# Patient Record
Sex: MALE | Race: WHITE | NOT HISPANIC OR LATINO | Employment: STUDENT | URBAN - METROPOLITAN AREA
[De-identification: names, ages, dates, MRNs, and addresses within clinical notes are randomized per-mention and may not be internally consistent; named-entity substitution may affect disease eponyms.]

---
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
## 2021-02-23 ENCOUNTER — TELEPHONE (OUTPATIENT)
Dept: FAMILY MEDICINE CLINIC | Facility: CLINIC | Age: 21
End: 2021-02-23

## 2021-02-23 NOTE — TELEPHONE ENCOUNTER
Patient's mother called  Patient is scheduled for HPV 3 on 03/10/21  He is scheduled to come home from [facility] this Thursday and she wants to know if he can move the appt to this Thursday   She is hoping it is not too soon   Please advise i

## 2021-02-25 ENCOUNTER — OFFICE VISIT (OUTPATIENT)
Dept: FAMILY MEDICINE CLINIC | Facility: CLINIC | Age: 21
End: 2021-02-25
Payer: COMMERCIAL
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
## 2021-02-25 DIAGNOSIS — Z23 ENCOUNTER FOR IMMUNIZATION: ICD-10-CM

## 2021-02-25 DIAGNOSIS — S80.862A INSECT BITE OF LEFT LOWER LEG, INITIAL ENCOUNTER: Primary | ICD-10-CM

## 2021-02-25 DIAGNOSIS — S80.862A INSECT BITE OF LEFT LOWER LEG, INITIAL ENCOUNTER: ICD-10-CM
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
## 2021-02-25 PROCEDURE — 90651 9VHPV VACCINE 2/3 DOSE IM: CPT | Performed by: FAMILY MEDICINE

## 2021-02-25 PROCEDURE — 99213 OFFICE O/P EST LOW 20 MIN: CPT | Performed by: FAMILY MEDICINE

## 2021-02-25 PROCEDURE — 90471 IMMUNIZATION ADMIN: CPT | Performed by: FAMILY MEDICINE

## 2021-02-25 RX ORDER — DOXYCYCLINE HYCLATE 100 MG
100 TABLET ORAL 2 TIMES DAILY
Qty: 28 TABLET | Refills: 0 | Status: SHIPPED | OUTPATIENT
Start: 2021-02-25 | End: 2021-03-11

## 2021-02-25 NOTE — TELEPHONE ENCOUNTER
Please call patient and see if he was able to  Rx - very unusual for this not to be covered   If it isn't covered he can get it for $20 with [facility] and I would recommend that

## 2021-02-25 NOTE — PROGRESS NOTES
Quinton Urbina 2000 male MRN: [id number]    Acute Visit    Assessment/Plan   Verdel Severance was seen today for wound check and injections  Diagnoses and all orders for this visit:    Insect bite of left lower leg, initial encounter  -     doxycycline hyclate (VIBRA-TABS) 100 mg tablet; Take 1 tablet (100 mg total) by mouth 2 (two) times a day for 14 days    Encounter for immunization  -     HPV VACCINE 9 VALENT IM    Doxycycline for PPX in the case of a tick bite, as it initially appeared targetoid and he was already covered for staph with Bactrim by health center  Counseled the patient regarding supportive care  They are to call or return to the office if not improving  Yoly Mauro MD  [address]  2/26/2021      Please be aware that this note contains text that was dictated and there may be errors pertaining to "sound-alike "words during the dictation process  SUBJECTIVE    CC: Wound Check (left leg (staph)) and Injections    HPI:  Quinton Urbina is a 24 y o  male who presented for an acute visit complaining of left lower leg wound  He says he noticed it about a week ago  He said he thought he had a bug bite  He noticed a red ring around it  When asked, he says it did look like a target (red ring with central clearing)  It then shrunk down to a central redness, with surrounding blistering, and scabbing in the middle  He saw the health center and got Bactrim for 7 days which he finished yesterday  He denies fever, arthralgias, weakness, tiredness  Has not been hiking or in contact with animals  Was living down at [facility] when this occurred  Review of Systems   Constitutional: Negative for chills, fatigue and fever  HENT: Negative for congestion  Respiratory: Negative for chest tightness and shortness of breath  Cardiovascular: Negative for chest pain and palpitations  Gastrointestinal: Negative for nausea and vomiting  Skin: Positive for wound     All other systems reviewed and are negative  Medications:   Meds/Allergies   Current Outpatient Medications   Medication Sig Dispense Refill    EPINEPHrine (EPIPEN) 0 3 mg/0 3 mL SOAJ Inject 0 3 mL (0 3 mg total) into a muscle once for 1 dose 2 each 1    doxycycline hyclate (VIBRA-TABS) 100 mg tablet Take 1 tablet (100 mg total) by mouth 2 (two) times a day for 14 days 28 tablet 0     No current facility-administered medications for this visit  Allergies   Allergen Reactions    Cashew Nut Oil Anaphylaxis    Nuts Anaphylaxis     CASHEW/ PISTACHIO    Pistachio Nut Extract Skin Test Anaphylaxis       OBJECTIVE    Vitals:   Vitals:    02/25/21 1144   BP: 130/80   Pulse: 99   Temp: 98 8 °F (37 1 °C)   Weight: 104 kg (230 lb)   Height: 6' 9" (2 057 m)       Physical Exam  Vitals signs and nursing note reviewed  Constitutional:       General: He is not in acute distress  Appearance: He is well-developed  He is not diaphoretic  HENT:      Head: Normocephalic and atraumatic  Right Ear: External ear normal       Left Ear: External ear normal    Eyes:      Conjunctiva/sclera: Conjunctivae normal    Pulmonary:      Effort: Pulmonary effort is normal  No respiratory distress  Skin:     Findings: Wound present  Comments: Central scab with some surrounding granulation tissue  Minimal surrounding erythema  Non-tender  Neurological:      Mental Status: He is alert  Cranial Nerves: No cranial nerve deficit

## 2021-02-26 ENCOUNTER — CLINICAL SUPPORT (OUTPATIENT)
Dept: FAMILY MEDICINE CLINIC | Facility: CLINIC | Age: 21
End: 2021-02-26

## 2021-02-26 VITALS
HEART RATE: 99 BPM | DIASTOLIC BLOOD PRESSURE: 80 MMHG | TEMPERATURE: 98.8 F | HEIGHT: 78 IN | WEIGHT: 230 LBS | SYSTOLIC BLOOD PRESSURE: 130 MMHG | BODY MASS INDEX: 26.61 KG/M2

## 2021-02-26 VITALS — HEART RATE: 85 BPM | OXYGEN SATURATION: 99 % | TEMPERATURE: 97.9 F

## 2021-02-26 DIAGNOSIS — S80.862A INSECT BITE OF LEFT LOWER LEG, INITIAL ENCOUNTER: Primary | ICD-10-CM
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
## 2021-02-26 PROCEDURE — RECHECK: Performed by: FAMILY MEDICINE

## 2021-02-26 RX ORDER — DOXYCYCLINE HYCLATE 100 MG
TABLET ORAL
Qty: 28 TABLET | Refills: 0 | OUTPATIENT
Start: 2021-02-26

## 2021-02-26 NOTE — PROGRESS NOTES
Patient present today for pulse oximeter recheck  Patient O2 in office today ws 99%  Results noted in chart,  Dr Elmira Snider made aware of results  No other complaints

## 2021-03-18 ENCOUNTER — OFFICE VISIT (OUTPATIENT)
Dept: FAMILY MEDICINE CLINIC | Facility: CLINIC | Age: 21
End: 2021-03-18
Payer: COMMERCIAL

## 2021-03-18 VITALS
HEIGHT: 78 IN | WEIGHT: 229 LBS | SYSTOLIC BLOOD PRESSURE: 122 MMHG | TEMPERATURE: 97.1 F | OXYGEN SATURATION: 98 % | HEART RATE: 88 BPM | DIASTOLIC BLOOD PRESSURE: 82 MMHG | BODY MASS INDEX: 26.49 KG/M2

## 2021-03-18 DIAGNOSIS — R23.4 ESCHAR OF LOWER LEG: Primary | ICD-10-CM

## 2021-03-18 PROCEDURE — 99214 OFFICE O/P EST MOD 30 MIN: CPT | Performed by: FAMILY MEDICINE

## 2021-03-18 PROCEDURE — 3725F SCREEN DEPRESSION PERFORMED: CPT | Performed by: FAMILY MEDICINE

## 2021-03-19 NOTE — PROGRESS NOTES
Chel Correia 2000 male MRN: [id number]    Acute Visit    Assessment/Plan   Referred to wound care (to surgery if unable to get into wound care within a week) for debridement and further management  ER precautions    Florinda Lundborg was seen today for follow-up  Diagnoses and all orders for this visit:    Eschar of lower leg  -     Ambulatory referral to Wound Care; Future  -     Ambulatory referral to General Surgery; Future        Kadi Santiago MD  [address]  3/19/2021      Please be aware that this note contains text that was dictated and there may be errors pertaining to "sound-alike "words during the dictation process  SUBJECTIVE    CC: Follow-up (wound on leg calf x 3 weeks )    HPI:  Chel Correia is a 24 y o  male who presented for an acute visit complaining of non-healing wound  Patient is returning for the same wound that he had been here for previously  Since then it has gotten slightly larger  Mom reports that it had a blistering appearance, and now for the last week has developed a blacked scab to it in the middle with scant discharge  Patient denies pain, itching, fever, nausea, red streaking  He is applying Neosporin and keeping it covered with a Band-Aid  He completed Bactrim, then Keflex without improvement  Review of Systems   Constitutional: Negative for chills and fever  HENT: Negative for ear pain and sore throat  Eyes: Negative for pain and visual disturbance  Respiratory: Negative for cough and shortness of breath  Cardiovascular: Negative for chest pain and palpitations  Gastrointestinal: Negative for abdominal pain and vomiting  Genitourinary: Negative for dysuria and hematuria  Musculoskeletal: Negative for arthralgias and back pain  Skin: Positive for wound  Negative for color change and rash  Neurological: Negative for seizures and syncope  All other systems reviewed and are negative          Medications:   Meds/Allergies   Current Outpatient Medications   Medication Sig Dispense Refill    EPINEPHrine (EPIPEN) 0 3 mg/0 3 mL SOAJ Inject 0 3 mL (0 3 mg total) into a muscle once for 1 dose 2 each 1     No current facility-administered medications for this visit  Allergies   Allergen Reactions    Cashew Nut Oil Anaphylaxis    Nuts Anaphylaxis     CASHEW/ PISTACHIO    Pistachio Nut Extract Skin Test Anaphylaxis     OBJECTIVE    Vitals:   Vitals:    03/18/21 1034   BP: 122/82   Pulse: 88   Temp: (!) 97 1 °F (36 2 °C)   SpO2: 98%   Weight: 104 kg (229 lb)   Height: 6' 9" (2 057 m)     Physical Exam  Vitals signs and nursing note reviewed  Constitutional:       General: He is not in acute distress  Appearance: He is well-developed  He is not diaphoretic  HENT:      Head: Normocephalic and atraumatic  Right Ear: External ear normal       Left Ear: External ear normal    Eyes:      Conjunctiva/sclera: Conjunctivae normal    Pulmonary:      Effort: Pulmonary effort is normal  No respiratory distress  Skin:     Findings: No rash  Comments: Left lower leg, midway, posterior aspect  Wound present approx 3cm diameter  Rolled edges around granulation tissue and approx 1cm eschar in center  Neurological:      Mental Status: He is alert  Cranial Nerves: No cranial nerve deficit

## 2021-03-25 ENCOUNTER — OFFICE VISIT (OUTPATIENT)
Dept: WOUND CARE | Facility: HOSPITAL | Age: 21
End: 2021-03-25
Payer: COMMERCIAL

## 2021-03-25 VITALS
SYSTOLIC BLOOD PRESSURE: 138 MMHG | HEART RATE: 88 BPM | DIASTOLIC BLOOD PRESSURE: 82 MMHG | WEIGHT: 230 LBS | RESPIRATION RATE: 16 BRPM | BODY MASS INDEX: 26.61 KG/M2 | HEIGHT: 78 IN | TEMPERATURE: 98.2 F

## 2021-03-25 DIAGNOSIS — S81.802A OPEN WOUND OF LEFT LOWER EXTREMITY, INITIAL ENCOUNTER: Primary | ICD-10-CM

## 2021-03-25 PROCEDURE — 11042 DBRDMT SUBQ TIS 1ST 20SQCM/<: CPT | Performed by: FAMILY MEDICINE

## 2021-03-25 PROCEDURE — 99203 OFFICE O/P NEW LOW 30 MIN: CPT | Performed by: FAMILY MEDICINE

## 2021-03-25 PROCEDURE — 99213 OFFICE O/P EST LOW 20 MIN: CPT | Performed by: FAMILY MEDICINE

## 2021-03-25 PROCEDURE — G0463 HOSPITAL OUTPT CLINIC VISIT: HCPCS | Performed by: FAMILY MEDICINE

## 2021-03-25 RX ORDER — LIDOCAINE HYDROCHLORIDE 40 MG/ML
5 SOLUTION TOPICAL ONCE
Status: COMPLETED | OUTPATIENT
Start: 2021-03-25 | End: 2021-03-25

## 2021-03-25 RX ADMIN — LIDOCAINE HYDROCHLORIDE 5 ML: 40 SOLUTION TOPICAL at 13:10

## 2021-03-25 NOTE — PROGRESS NOTES
Patient ID: Panda Fischer is a 24 y o  male Date of Birth 2000     Chief Complaint  Chief Complaint   Patient presents with   [facility] Patient Visit     left leg wound        Allergies  Cashew nut oil, Nuts, and Pistachio nut extract skin test    Assessment:    Open wound of left lower extremity    Initial evaluation   Debrided as below   Wound management dermagran   No harsh cleansers such as alcohol, peroxide, or antibacterial soap   followup in 1 week or call sooner with questions or concerns       Diagnoses and all orders for this visit:    Open wound of left lower extremity, initial encounter  -     lidocaine (XYLOCAINE) 4 % topical solution 5 mL  -     Wound cleansing and dressings; Future  -     Wound miscellaneous orders; Future  -     Debridement              Debridement   Wound 03/25/21 Traumatic Leg Left;Posterior    Universal Protocol:  Consent: Verbal consent obtained  Consent given by: patient  Time out: Immediately prior to procedure a "time out" was called to verify the correct patient, procedure, equipment, support staff and site/side marked as required    Timeout called at: 3/25/2021 1:10 PM   Patient understanding: patient states understanding of the procedure being performed  Patient identity confirmed: verbally with patient      Performed by: physician  Debridement type: surgical  Level of debridement: subcutaneous tissue  Pain control: lidocaine 4%  Post-debridement measurements  Length (cm): 2 4  Width (cm): 2  Depth (cm): 0 3  Percent debrided: 100%  Surface Area (cm^2): 4 8  Area debrided (cm^2): 4 8  Volume (cm^3): 1 44  Tissue and other material debrided: subcutaneous tissue  Devitalized tissue debrided: exudate, slough and eschar  Instrument(s) utilized: forceps, blade and curette  Bleeding: small  Hemostasis obtained with: pressure  Procedural pain (0-10): 0  Post-procedural pain: 0   Response to treatment: procedure was tolerated well          Plan:     Wound 03/25/21 Traumatic Leg Left;Posterior (Active)   Wound Image Images linked 03/25/21 1303   Wound Description Slough;Black;Granulation tissue; Yellow;Pink 03/25/21 1310   Amanda-wound Assessment Pink; Intact 03/25/21 1310   Wound Length (cm) 2 4 cm 03/25/21 1310   Wound Width (cm) 2 cm 03/25/21 1310   Wound Depth (cm) 0 2 cm 03/25/21 1310   Wound Surface Area (cm^2) 4 8 cm^2 03/25/21 1310   Wound Volume (cm^3) 0 96 cm^3 03/25/21 1310   Calculated Wound Volume (cm^3) 0 96 cm^3 03/25/21 1310   Drainage Amount Small 03/25/21 1310   Drainage Description Serosanguineous 03/25/21 1310   Non-staged Wound Description Full thickness 03/25/21 1310       Wound 09/24/20 Ear Bilateral (Active)   Date First Assessed/Time First Assessed: 09/24/20 1146   Location: Ear  Wound Location Orientation: Bilateral  Incision's 1st Dressing: DRESSING XEROFORM 5 X 9 (x2), SPONGE GAUZE 4 X 4 16 PLY STRL PLASTIC TRAY LF (x2), DRESSING SURGIPAD 5 X 9 IN STRL     Wound 03/25/21 Traumatic Leg Left;Posterior (Active)   Date First Assessed/Time First Assessed: 03/25/21 1302   Primary Wound Type: Traumatic  Location: Leg  Wound Location Orientation: Left;Posterior       Subjective:        Patient presents for initial evaluation of left lower extremity wound present for approximately 1 month  Patient states that he woke up one morning with it and thus assumed it a was bug bite  He has seen his PCP twice and treated with a course of antibiotics which he completed about a week and half ago  He has been using topical Neosporin and covering with a Band-Aid  He has been cleaning with soap and water  No diabetes  No smoking  The following portions of the patient's history were reviewed and updated as appropriate:   He  has no past medical history on file    He   Patient Active Problem List    Diagnosis Date Noted    Open wound of left lower extremity 03/25/2021     He  has a past surgical history that includes No past surgeries and pr otoplasty protruding ear w/wo size rdctj (Bilateral, 9/24/2020)  His family history is not on file  He  reports that he has never smoked  He has never used smokeless tobacco  He reports that he does not drink alcohol or use drugs  Current Outpatient Medications   Medication Sig Dispense Refill    EPINEPHrine (EPIPEN) 0 3 mg/0 3 mL SOAJ Inject 0 3 mL (0 3 mg total) into a muscle once for 1 dose 2 each 1     No current facility-administered medications for this visit  He is allergic to cashew nut oil; nuts; and pistachio nut extract skin test     Review of Systems   Constitutional: Negative for chills and fever  HENT: Negative for congestion and sneezing  Respiratory: Negative for cough  Cardiovascular: Negative for leg swelling  Musculoskeletal: Negative for gait problem  Skin: Positive for wound  Psychiatric/Behavioral: Negative for agitation  Objective:       Wound 03/25/21 Traumatic Leg Left;Posterior (Active)   Wound Image Images linked 03/25/21 1303   Wound Description Slough;Black;Granulation tissue; Yellow;Pink 03/25/21 1310   Amanda-wound Assessment Pink; Intact 03/25/21 1310   Wound Length (cm) 2 4 cm 03/25/21 1310   Wound Width (cm) 2 cm 03/25/21 1310   Wound Depth (cm) 0 2 cm 03/25/21 1310   Wound Surface Area (cm^2) 4 8 cm^2 03/25/21 1310   Wound Volume (cm^3) 0 96 cm^3 03/25/21 1310   Calculated Wound Volume (cm^3) 0 96 cm^3 03/25/21 1310   Drainage Amount Small 03/25/21 1310   Drainage Description Serosanguineous 03/25/21 1310   Non-staged Wound Description Full thickness 03/25/21 1310       /82   Pulse 88   Temp 98 2 °F (36 8 °C)   Resp 16   Ht 6' 9" (2 057 m)   Wt 104 kg (230 lb)   BMI 24 65 kg/m²     Physical Exam  Constitutional:       General: He is not in acute distress  Appearance: Normal appearance  He is not ill-appearing or toxic-appearing  HENT:      Head: Normocephalic and atraumatic        Right Ear: External ear normal       Left Ear: External ear normal    Eyes: Conjunctiva/sclera: Conjunctivae normal    Neck:      Musculoskeletal: Neck supple  Pulmonary:      Effort: Pulmonary effort is normal  No respiratory distress  Musculoskeletal:      Right lower leg: No edema  Left lower leg: No edema  Skin:     Comments: See wound assessment   Neurological:      Mental Status: He is alert  Gait: Gait normal    Psychiatric:         Mood and Affect: Mood normal          Behavior: Behavior normal            Wound Instructions:  Orders Placed This Encounter   Procedures    Wound cleansing and dressings     Wash your hands with soap and water  Remove old dressing, discard into plastic bag and place in trash  Cleanse the wound with mild soap and water prior to applying a clean dressing  Do not use tissue or cotton balls  Do not scrub the wound  Pat dry using gauze  Shower yes   Apply dermagran gauze to wound   Cover with gauze   Secure with alma delia and tape   Change dressing every other day   This was performed at wound care center today     Standing Status:   Future     Standing Expiration Date:   3/25/2022    Wound miscellaneous orders     Supplies ordered through [employer]  Phone number [phone]     Standing Status:   Future     Standing Expiration Date:   3/25/2022    Debridement     This order was created via procedure documentation        Diagnosis ICD-10-CM Associated Orders   1   Open wound of left lower extremity, initial encounter  S81 802A lidocaine (XYLOCAINE) 4 % topical solution 5 mL     Wound cleansing and dressings     Wound miscellaneous orders     Debridement

## 2021-03-25 NOTE — ASSESSMENT & PLAN NOTE
Initial evaluation   Debrided as below   Wound management dermagran   No harsh cleansers such as alcohol, peroxide, or antibacterial soap   followup in 1 week or call sooner with questions or concerns

## 2021-03-25 NOTE — PATIENT INSTRUCTIONS
Orders Placed This Encounter   Procedures    Wound cleansing and dressings     Wash your hands with soap and water  Remove old dressing, discard into plastic bag and place in trash  Cleanse the wound with mild soap and water prior to applying a clean dressing  Do not use tissue or cotton balls  Do not scrub the wound  Pat dry using gauze  Shower yes   Apply dermagran gauze to wound   Cover with gauze   Secure with alma delia and tape   Change dressing every other day   This was performed at wound care center today     Standing Status:   Future     Standing Expiration Date:   3/25/2022    Wound miscellaneous orders     Supplies ordered through [employer]   Phone number [phone]      Standing Status:   Future     Standing Expiration Date:   3/25/2022

## 2021-04-01 ENCOUNTER — OFFICE VISIT (OUTPATIENT)
Dept: WOUND CARE | Facility: HOSPITAL | Age: 21
End: 2021-04-01
Payer: COMMERCIAL

## 2021-04-01 VITALS
RESPIRATION RATE: 16 BRPM | TEMPERATURE: 97.9 F | DIASTOLIC BLOOD PRESSURE: 76 MMHG | HEART RATE: 80 BPM | SYSTOLIC BLOOD PRESSURE: 122 MMHG

## 2021-04-01 DIAGNOSIS — S81.802A OPEN WOUND OF LEFT LOWER EXTREMITY, INITIAL ENCOUNTER: Primary | ICD-10-CM

## 2021-04-01 PROCEDURE — 11042 DBRDMT SUBQ TIS 1ST 20SQCM/<: CPT | Performed by: FAMILY MEDICINE

## 2021-04-01 RX ORDER — LIDOCAINE HYDROCHLORIDE 40 MG/ML
5 SOLUTION TOPICAL ONCE
Status: COMPLETED | OUTPATIENT
Start: 2021-04-01 | End: 2021-04-01

## 2021-04-01 RX ADMIN — LIDOCAINE HYDROCHLORIDE 5 ML: 40 SOLUTION TOPICAL at 14:26

## 2021-04-01 NOTE — PATIENT INSTRUCTIONS
Orders Placed This Encounter   Procedures    Wound cleansing and dressings     Wound cleansing and dressings       Wash your hands with soap and water  Remove old dressing, discard into plastic bag and place in trash  Cleanse the wound with mild soap and water prior to applying a clean dressing  Do not scrub the wound  Pat dry using gauze  Shower yes on dressing change days  Sponge bath other days or use cast cover    Apply puracol ag to wound bed  Cut to fit   Followed by hydrogel   Cover with gauze   Secure with alma delia and tape   Change dressing 3 times a week   This was performed at wound care center today     Standing Status:   Future     Standing Expiration Date:   4/1/2022

## 2021-04-01 NOTE — PROGRESS NOTES
Patient ID: Rose Rivera is a 24 y o  male Date of Birth 2000     Chief Complaint  Chief Complaint   Patient presents with    Follow Up Wound Care Visit     left leg wound        Allergies  Cashew nut oil - food allergy, Nuts - food allergy, and Pistachio nut extract skin test - food allergy    Assessment:    Open wound of left lower extremity   Base of the wound has improved but the size has not changed dramatically  Debrided as below   to purachol Ag and Hydrogel   followup in 1 week or call sooner with questions or concerns       Diagnoses and all orders for this visit:    Open wound of left lower extremity, initial encounter  -     lidocaine (XYLOCAINE) 4 % topical solution 5 mL  -     Wound cleansing and dressings; Future  -     Debridement              Debridement   Wound 03/25/21 Traumatic Leg Left;Posterior    Universal Protocol:  Consent: Verbal consent obtained  Consent given by: patient  Time out: Immediately prior to procedure a "time out" was called to verify the correct patient, procedure, equipment, support staff and site/side marked as required    Timeout called at: 4/1/2021 2:00 PM   Patient understanding: patient states understanding of the procedure being performed  Patient identity confirmed: verbally with patient      Performed by: physician  Debridement type: surgical  Level of debridement: subcutaneous tissue  Pain control: lidocaine 4%  Post-debridement measurements  Length (cm): 2 3  Width (cm): 2  Depth (cm): 0 2  Percent debrided: 100%  Surface Area (cm^2): 4 6  Area debrided (cm^2): 4 6  Volume (cm^3): 0 92  Tissue and other material debrided: subcutaneous tissue  Devitalized tissue debrided: exudate and slough  Instrument(s) utilized: curette  Bleeding: small  Hemostasis obtained with: pressure  Procedural pain (0-10): 0  Post-procedural pain: 0   Response to treatment: procedure was tolerated well          Plan:     Wound 03/25/21 Traumatic Leg Left;Posterior (Active)   Wound Image Images linked 04/01/21 1447   Wound Description Granulation tissue;Pink 04/01/21 1427   Amanda-wound Assessment Pink; Intact 04/01/21 1427   Wound Length (cm) 2 3 cm 04/01/21 1427   Wound Width (cm) 2 cm 04/01/21 1427   Wound Depth (cm) 0 1 cm 04/01/21 1427   Wound Surface Area (cm^2) 4 6 cm^2 04/01/21 1427   Wound Volume (cm^3) 0 46 cm^3 04/01/21 1427   Calculated Wound Volume (cm^3) 0 46 cm^3 04/01/21 1427   Change in Wound Size % 52 08 04/01/21 1427   Drainage Amount Small 04/01/21 1427   Drainage Description Serosanguineous 04/01/21 1427   Non-staged Wound Description Full thickness 04/01/21 1427       Wound 09/24/20 Ear Bilateral (Active)   Date First Assessed/Time First Assessed: 09/24/20 1146   Location: Ear  Wound Location Orientation: Bilateral  Incision's 1st Dressing: DRESSING XEROFORM 5 X 9 (x2), SPONGE GAUZE 4 X 4 16 PLY STRL PLASTIC TRAY LF (x2), DRESSING SURGIPAD 5 X 9 IN STRL     Wound 03/25/21 Traumatic Leg Left;Posterior (Active)   Date First Assessed/Time First Assessed: 03/25/21 1302   Primary Wound Type: Traumatic  Location: Leg  Wound Location Orientation: Left;Posterior       Subjective:           3/25/21:Patient presents for initial evaluation of left lower extremity wound present for approximately 1 month  Patient states that he woke up one morning with it and thus assumed it a was bug bite  He has seen his PCP twice and treated with a course of antibiotics which he completed about a week and half ago  He has been using topical Neosporin and covering with a Band-Aid  He has been cleaning with soap and water  No diabetes  No smoking        4/1/21: Patient presents for followup of a left lower extremity wound  No increased pain or drainage  Has been using dermagran on the wound      The following portions of the patient's history were reviewed and updated as appropriate: He  has no past medical history on file  He  reports that he has never smoked   He has never used smokeless tobacco  He reports that he does not drink alcohol or use drugs  He is allergic to cashew nut oil - food allergy; nuts - food allergy; and pistachio nut extract skin test - food allergy       Review of Systems   Constitutional: Negative for chills and fever  HENT: Negative for congestion and sneezing  Respiratory: Negative for cough  Musculoskeletal: Negative for gait problem  Skin: Positive for wound  Psychiatric/Behavioral: Negative for agitation  Objective:       Wound 03/25/21 Traumatic Leg Left;Posterior (Active)   Wound Image Images linked 04/01/21 1447   Wound Description Granulation tissue;Pink 04/01/21 1427   Amanda-wound Assessment Pink; Intact 04/01/21 1427   Wound Length (cm) 2 3 cm 04/01/21 1427   Wound Width (cm) 2 cm 04/01/21 1427   Wound Depth (cm) 0 1 cm 04/01/21 1427   Wound Surface Area (cm^2) 4 6 cm^2 04/01/21 1427   Wound Volume (cm^3) 0 46 cm^3 04/01/21 1427   Calculated Wound Volume (cm^3) 0 46 cm^3 04/01/21 1427   Change in Wound Size % 52 08 04/01/21 1427   Drainage Amount Small 04/01/21 1427   Drainage Description Serosanguineous 04/01/21 1427   Non-staged Wound Description Full thickness 04/01/21 1427       /76   Pulse 80   Temp 97 9 °F (36 6 °C)   Resp 16     Physical Exam      Wound Instructions:  Orders Placed This Encounter   Procedures    Wound cleansing and dressings     Wound cleansing and dressings       Wash your hands with soap and water  Remove old dressing, discard into plastic bag and place in trash  Cleanse the wound with mild soap and water prior to applying a clean dressing  Do not scrub the wound  Pat dry using gauze  Shower yes on dressing change days  Sponge bath other days or use cast cover    Apply puracol ag to wound bed  Cut to fit   Followed by hydrogel   Cover with gauze   Secure with alma delia and tape   Change dressing 3 times a week   This was performed at wound care center today     Standing Status:   Future Standing Expiration Date:   4/1/2022    Debridement     This order was created via procedure documentation        Diagnosis ICD-10-CM Associated Orders   1   Open wound of left lower extremity, initial encounter  S81 802A lidocaine (XYLOCAINE) 4 % topical solution 5 mL     Wound cleansing and dressings     Debridement

## 2021-04-01 NOTE — ASSESSMENT & PLAN NOTE
Base of the wound has improved but the size has not changed dramatically  Debrided as below   to purachol Ag and Hydrogel   followup in 1 week or call sooner with questions or concerns

## 2021-04-08 ENCOUNTER — OFFICE VISIT (OUTPATIENT)
Dept: WOUND CARE | Facility: HOSPITAL | Age: 21
End: 2021-04-08
Payer: COMMERCIAL

## 2021-04-08 VITALS
HEART RATE: 104 BPM | TEMPERATURE: 96.4 F | RESPIRATION RATE: 14 BRPM | SYSTOLIC BLOOD PRESSURE: 112 MMHG | DIASTOLIC BLOOD PRESSURE: 76 MMHG

## 2021-04-08 DIAGNOSIS — S81.802A OPEN WOUND OF LEFT LOWER EXTREMITY, INITIAL ENCOUNTER: Primary | ICD-10-CM

## 2021-04-08 PROCEDURE — 99212 OFFICE O/P EST SF 10 MIN: CPT | Performed by: FAMILY MEDICINE

## 2021-04-08 PROCEDURE — G0463 HOSPITAL OUTPT CLINIC VISIT: HCPCS | Performed by: FAMILY MEDICINE

## 2021-04-08 PROCEDURE — 99213 OFFICE O/P EST LOW 20 MIN: CPT | Performed by: FAMILY MEDICINE

## 2021-04-08 RX ORDER — LIDOCAINE HYDROCHLORIDE 40 MG/ML
5 SOLUTION TOPICAL ONCE
Status: COMPLETED | OUTPATIENT
Start: 2021-04-08 | End: 2021-04-08

## 2021-04-08 RX ADMIN — LIDOCAINE HYDROCHLORIDE 5 ML: 40 SOLUTION TOPICAL at 13:16

## 2021-04-08 NOTE — PATIENT INSTRUCTIONS
Orders Placed This Encounter   Procedures    Wound cleansing and dressings     Wash your hands with soap and water  Remove old dressing, discard into plastic bag and place in trash  Cleanse the wound with mild soap and water prior to applying a clean dressing  Do not scrub the wound  Pat dry using gauze  Shower yes on dressing change days  Sponge bath other days or use cast cover     Apply puracol ag to wound bed  Cut to fit   Followed by hydrogel   Cover with gauze   Secure with alma delia and tape   Change dressing 3 times a week   This was performed at wound care center today     Standing Status:   Future     Standing Expiration Date:   4/8/2022

## 2021-04-09 NOTE — ASSESSMENT & PLAN NOTE
Wound is improved   wound is clean, no debridement today  Continue wound management with purachol Ag and Hydrogel   followup in 2 weeks or call sooner with questions or concerns

## 2021-04-09 NOTE — PROGRESS NOTES
Patient ID: Nyoka Snellen is a 24 y o  male Date of Birth 2000     Chief Complaint  Chief Complaint   Patient presents with    Follow Up Wound Care Visit     left leg wound       Allergies  Cashew nut oil - food allergy, Nuts - food allergy, and Pistachio nut extract skin test - food allergy    Assessment:    Open wound of left lower extremity   Wound is improved   wound is clean, no debridement today  Continue wound management with purachol Ag and Hydrogel   followup in 2 weeks or call sooner with questions or concerns       Diagnoses and all orders for this visit:    Open wound of left lower extremity, initial encounter  -     lidocaine (XYLOCAINE) 4 % topical solution 5 mL  -     Wound cleansing and dressings; Future              Procedures    Plan:     Wound 03/25/21 Traumatic Leg Left;Posterior (Active)   Wound Image Images linked 04/08/21 1311   Wound Description Granulation tissue;Pink;Epithelialization 04/08/21 1315   Amanda-wound Assessment Pink; Intact 04/08/21 1315   Wound Length (cm) 2 cm 04/08/21 1315   Wound Width (cm) 1 9 cm 04/08/21 1315   Wound Depth (cm) 0 1 cm 04/08/21 1315   Wound Surface Area (cm^2) 3 8 cm^2 04/08/21 1315   Wound Volume (cm^3) 0 38 cm^3 04/08/21 1315   Calculated Wound Volume (cm^3) 0 38 cm^3 04/08/21 1315   Change in Wound Size % 60 42 04/08/21 1315   Drainage Amount Small 04/08/21 1315   Drainage Description Serosanguineous; Bloody 04/08/21 1315   Non-staged Wound Description Full thickness 04/08/21 1315       Wound 09/24/20 Ear Bilateral (Active)   Date First Assessed/Time First Assessed: 09/24/20 1146   Location: Ear  Wound Location Orientation: Bilateral  Incision's 1st Dressing: DRESSING XEROFORM 5 X 9 (x2), SPONGE GAUZE 4 X 4 16 PLY STRL PLASTIC TRAY LF (x2), DRESSING SURGIPAD 5 X 9 IN STRL            Wound 03/25/21 Traumatic Leg Left;Posterior (Active)   Date First Assessed/Time First Assessed: 03/25/21 1302   Primary Wound Type: Traumatic  Location: Leg  Wound Location Orientation: Left;Posterior       Subjective:           3/25/21:Patient presents for initial evaluation of left lower extremity wound present for approximately 1 month  Patient states that he woke up one morning with it and thus assumed it a was bug bite  He has seen his PCP twice and treated with a course of antibiotics which he completed about a week and half ago  He has been using topical Neosporin and covering with a Band-Aid  He has been cleaning with soap and water  No diabetes  No smoking       4/1/21: Patient presents for followup of a left lower extremity wound  No increased pain or drainage  Has been using dermagran on the wound    4/9/21:  followup left lower extremity wound  No increased pain or drainage  Has been using purachol Ag and Hydrogel      The following portions of the patient's history were reviewed and updated as appropriate: He  has no past medical history on file  He  reports that he has never smoked  He has never used smokeless tobacco  He reports that he does not drink alcohol or use drugs  He is allergic to cashew nut oil - food allergy; nuts - food allergy; and pistachio nut extract skin test - food allergy       Review of Systems   Constitutional: Negative for chills and fever  HENT: Negative for congestion and sneezing  Respiratory: Negative for cough  Musculoskeletal: Negative for gait problem  Skin: Positive for wound  Psychiatric/Behavioral: Negative for agitation  Objective:       Wound 03/25/21 Traumatic Leg Left;Posterior (Active)   Wound Image Images linked 04/08/21 1311   Wound Description Granulation tissue;Pink;Epithelialization 04/08/21 1315   Amanda-wound Assessment Pink; Intact 04/08/21 1315   Wound Length (cm) 2 cm 04/08/21 1315   Wound Width (cm) 1 9 cm 04/08/21 1315   Wound Depth (cm) 0 1 cm 04/08/21 1315   Wound Surface Area (cm^2) 3 8 cm^2 04/08/21 1315   Wound Volume (cm^3) 0 38 cm^3 04/08/21 1315   Calculated Wound Volume (cm^3) 0 38 cm^3 04/08/21 1315   Change in Wound Size % 60 42 04/08/21 1315   Drainage Amount Small 04/08/21 1315   Drainage Description Serosanguineous; Bloody 04/08/21 1315   Non-staged Wound Description Full thickness 04/08/21 1315       /76   Pulse 104   Temp (!) 96 4 °F (35 8 °C)   Resp 14     Physical Exam  Constitutional:       General: He is not in acute distress  Appearance: Normal appearance  He is not ill-appearing, toxic-appearing or diaphoretic  HENT:      Head: Normocephalic and atraumatic  Right Ear: External ear normal       Left Ear: External ear normal    Eyes:      Conjunctiva/sclera: Conjunctivae normal    Neck:      Musculoskeletal: Neck supple  Pulmonary:      Effort: Pulmonary effort is normal  No respiratory distress  Musculoskeletal:      Right lower leg: No edema  Left lower leg: No edema  Skin:     Comments: See wound assessment   Neurological:      Mental Status: He is alert  Gait: Gait normal    Psychiatric:         Mood and Affect: Mood normal          Behavior: Behavior normal            Wound Instructions:  Orders Placed This Encounter   Procedures    Wound cleansing and dressings     Wash your hands with soap and water  Remove old dressing, discard into plastic bag and place in trash  Cleanse the wound with mild soap and water prior to applying a clean dressing  Do not scrub the wound  Pat dry using gauze  Shower yes on dressing change days  Sponge bath other days or use cast cover     Apply puracol ag to wound bed  Cut to fit  Followed by hydrogel   Cover with gauze   Secure with alma delia and tape   Change dressing 3 times a week   This was performed at wound care center today     Standing Status:   Future     Standing Expiration Date:   4/8/2022        Diagnosis ICD-10-CM Associated Orders   1   Open wound of left lower extremity, initial encounter  S81 802A lidocaine (XYLOCAINE) 4 % topical solution 5 mL     Wound cleansing and dressings

## 2021-04-22 ENCOUNTER — OFFICE VISIT (OUTPATIENT)
Dept: WOUND CARE | Facility: HOSPITAL | Age: 21
End: 2021-04-22
Payer: COMMERCIAL

## 2021-04-22 VITALS
SYSTOLIC BLOOD PRESSURE: 130 MMHG | HEART RATE: 68 BPM | DIASTOLIC BLOOD PRESSURE: 80 MMHG | RESPIRATION RATE: 20 BRPM | TEMPERATURE: 98.3 F

## 2021-04-22 DIAGNOSIS — S81.802A OPEN WOUND OF LEFT LOWER EXTREMITY, INITIAL ENCOUNTER: Primary | ICD-10-CM

## 2021-04-22 PROCEDURE — 17250 CHEM CAUT OF GRANLTJ TISSUE: CPT | Performed by: FAMILY MEDICINE

## 2021-04-22 RX ORDER — LIDOCAINE HYDROCHLORIDE 40 MG/ML
5 SOLUTION TOPICAL ONCE
Status: COMPLETED | OUTPATIENT
Start: 2021-04-22 | End: 2021-04-22

## 2021-04-22 RX ADMIN — LIDOCAINE HYDROCHLORIDE 5 ML: 40 SOLUTION TOPICAL at 15:23

## 2021-04-22 NOTE — PROGRESS NOTES
Patient ID: Osvaldo Torrez is a 24 y o  male Date of Birth 2000     Chief Complaint  Chief Complaint   Patient presents with    Follow Up Wound Care Visit     Left lower leg       Allergies  Cashew nut oil - food allergy, Nuts - food allergy, and Pistachio nut extract skin test - food allergy    Assessment:    Open wound of left lower extremity   Wound is improved but appears slightly hyper granular today  Hyper granular areas were cauterized today using silver nitrate   Continue wound management with Purachol Ag   Followup in 2 weeks or call sooner with questions or concerns       Diagnoses and all orders for this visit:    Open wound of left lower extremity, initial encounter  -     lidocaine (XYLOCAINE) 4 % topical solution 5 mL  -     Wound cleansing and dressings; Future    Other orders  -     Chemical Caut Of A Wound                    Chemical Caut Of A Wound     Date/Time 4/22/2021 3:15 PM     Performed by  Jess Urban DO     Authorized by Jess Urban DO       Associated Wounds:   Wound 03/25/21 Traumatic Leg Left;Posterior     Universal Protocol   Consent: Verbal consent obtained  Consent given by: patient  Timeout called at: 4/22/2021 3:15 PM   Patient understanding: patient states understanding of the procedure being performed  Patient identity confirmed: verbally with patient        Local anesthesia used: yes     Anesthesia   Local anesthesia used: yes  Local Anesthetic: topical anesthetic     Sedation   Patient sedated: no        Specimen: no    Culture: no   Procedure Details   Procedure Notes: 1 stick used of silver nitrate  Patient tolerance: patient tolerated the procedure well with no immediate complications             Plan:     Wound 03/25/21 Traumatic Leg Left;Posterior (Active)   Wound Image Images linked 04/22/21 1518   Wound Description Granulation tissue; Hypergranulation;Pink;Yellow 04/22/21 1517   Amanda-wound Assessment Intact;Scar Tissue 04/22/21 1517   Wound Length (cm) 1 2 cm 04/22/21 1517   Wound Width (cm) 1 2 cm 04/22/21 1517   Wound Depth (cm) 0 1 cm 04/22/21 1517   Wound Surface Area (cm^2) 1 44 cm^2 04/22/21 1517   Wound Volume (cm^3) 0 14 cm^3 04/22/21 1517   Calculated Wound Volume (cm^3) 0 14 cm^3 04/22/21 1517   Change in Wound Size % 85 42 04/22/21 1517   Drainage Amount Scant 04/22/21 1517   Drainage Description Serosanguineous 04/22/21 1517   Non-staged Wound Description Full thickness 04/22/21 1517       Wound 09/24/20 Ear Bilateral (Active)   Date First Assessed/Time First Assessed: 09/24/20 1146   Location: Ear  Wound Location Orientation: Bilateral  Incision's 1st Dressing: DRESSING XEROFORM 5 X 9 (x2), SPONGE GAUZE 4 X 4 16 PLY STRL PLASTIC TRAY LF (x2), DRESSING SURGIPAD 5 X 9 IN STRL     Wound 03/25/21 Traumatic Leg Left;Posterior (Active)   Date First Assessed/Time First Assessed: 03/25/21 1302   Primary Wound Type: Traumatic  Location: Leg  Wound Location Orientation: Left;Posterior       Subjective:            3/25/21:Patient presents for initial evaluation of left lower extremity wound present for approximately 1 month  Patient states that he woke up one morning with it and thus assumed it a was bug bite  He has seen his PCP twice and treated with a course of antibiotics which he completed about a week and half ago  He has been using topical Neosporin and covering with a Band-Aid  He has been cleaning with soap and water  No diabetes  No smoking       4/1/21: Patient presents for followup of a left lower extremity wound  No increased pain or drainage  Has been using dermagran on the wound     4/9/21:  followup left lower extremity wound  No increased pain or drainage  Has been using purachol Ag and Hydrogel    4/22: Patient presents for followup of left lower extremity wound  No increased pain or drainage    Has been using purachol Ag and Hydrogel      The following portions of the patient's history were reviewed and updated as appropriate: He  has no past medical history on file  He  reports that he has never smoked  He has never used smokeless tobacco  He reports that he does not drink alcohol or use drugs  He is allergic to cashew nut oil - food allergy; nuts - food allergy; and pistachio nut extract skin test - food allergy       Review of Systems   Constitutional: Negative for chills and fever  HENT: Negative for congestion and sneezing  Respiratory: Negative for cough  Cardiovascular: Negative for leg swelling  Musculoskeletal: Negative for gait problem  Skin: Positive for wound  Psychiatric/Behavioral: Negative for agitation  Objective:       Wound 03/25/21 Traumatic Leg Left;Posterior (Active)   Wound Image Images linked 04/22/21 1518   Wound Description Granulation tissue; Hypergranulation;Pink;Yellow 04/22/21 1517   Amanda-wound Assessment Intact;Scar Tissue 04/22/21 1517   Wound Length (cm) 1 2 cm 04/22/21 1517   Wound Width (cm) 1 2 cm 04/22/21 1517   Wound Depth (cm) 0 1 cm 04/22/21 1517   Wound Surface Area (cm^2) 1 44 cm^2 04/22/21 1517   Wound Volume (cm^3) 0 14 cm^3 04/22/21 1517   Calculated Wound Volume (cm^3) 0 14 cm^3 04/22/21 1517   Change in Wound Size % 85 42 04/22/21 1517   Drainage Amount Scant 04/22/21 1517   Drainage Description Serosanguineous 04/22/21 1517   Non-staged Wound Description Full thickness 04/22/21 1517       /80   Pulse 68   Temp 98 3 °F (36 8 °C) (Temporal)   Resp 20     Physical Exam      Wound Instructions:  Orders Placed This Encounter   Procedures    Wound cleansing and dressings     Wound cleansing and dressings  LLE Wound:    Wash your hands with soap and water  Cleanse the wound with mild soap and water prior to applying a clean dressing  Do not scrub the wound  Pat dry using gauze  Shower yes on dressing change days  Sponge bath other days or use cast cover     Apply puracol ag to wound bed  Cut to fit   Followed by hydrogel   Cover with gauze   Secure with alma delia and tape   Change dressing 3 times a week     Your wound will have a gray appearance, this will fade over time  This was performed at wound care center today    Follow up in 2 weeks at the [address] [facility],[address]     Standing Status:   Future     Standing Expiration Date:   4/22/2022    Chemical Caut Of A Wound     This order was created via procedure documentation        Diagnosis ICD-10-CM Associated Orders   1   Open wound of left lower extremity, initial encounter  S81 802A lidocaine (XYLOCAINE) 4 % topical solution 5 mL     Wound cleansing and dressings

## 2021-04-22 NOTE — PATIENT INSTRUCTIONS
Orders Placed This Encounter   Procedures    Wound cleansing and dressings     Wound cleansing and dressings  LLE Wound:    Wash your hands with soap and water  Cleanse the wound with mild soap and water prior to applying a clean dressing  Do not scrub the wound  Pat dry using gauze  Shower yes on dressing change days  Sponge bath other days or use cast cover     Apply puracol ag to wound bed  Cut to fit  Followed by hydrogel   Cover with gauze   Secure with alma delia and tape   Change dressing 3 times a week     Your wound will have a gray appearance, this will fade over time        This was performed at wound care center today    Follow up in 2 weeks at the [address] [facility],[address]     Standing Status:   Future     Standing Expiration Date:   4/22/2022

## 2021-04-22 NOTE — ASSESSMENT & PLAN NOTE
Wound is improved but appears slightly hyper granular today  Hyper granular areas were cauterized today using silver nitrate   Continue wound management with Purachol Ag   Followup in 2 weeks or call sooner with questions or concerns

## 2021-05-20 ENCOUNTER — TELEPHONE (OUTPATIENT)
Dept: WOUND CARE | Facility: HOSPITAL | Age: 21
End: 2021-05-20

## 2021-05-20 NOTE — TELEPHONE ENCOUNTER
Call from patient's mom stating that wound appears completely healed with smooth skin  It is a bit of a hardship with the patient's work schedule for him to make appointments  I advised that if they needed us in the future to please call  Kevin Barker verbalized thanks

## 2021-07-16 ENCOUNTER — OFFICE VISIT (OUTPATIENT)
Dept: WOUND CARE | Facility: HOSPITAL | Age: 21
End: 2021-07-16
Payer: COMMERCIAL

## 2021-07-16 VITALS
DIASTOLIC BLOOD PRESSURE: 74 MMHG | RESPIRATION RATE: 18 BRPM | SYSTOLIC BLOOD PRESSURE: 104 MMHG | HEART RATE: 66 BPM | WEIGHT: 230.82 LBS | BODY MASS INDEX: 26.71 KG/M2 | TEMPERATURE: 97.7 F | HEIGHT: 78 IN

## 2021-07-16 DIAGNOSIS — T63.304S: Primary | ICD-10-CM

## 2021-07-16 PROCEDURE — G0463 HOSPITAL OUTPT CLINIC VISIT: HCPCS | Performed by: FAMILY MEDICINE

## 2021-07-16 PROCEDURE — 99213 OFFICE O/P EST LOW 20 MIN: CPT | Performed by: FAMILY MEDICINE

## 2021-07-16 RX ORDER — TRIAMCINOLONE ACETONIDE 1 MG/G
CREAM TOPICAL 2 TIMES DAILY
Qty: 30 G | Refills: 0 | Status: ON HOLD | OUTPATIENT
Start: 2021-07-16 | End: 2022-06-23

## 2021-07-16 NOTE — PATIENT INSTRUCTIONS
Orders Placed This Encounter   Procedures    Wound cleansing and dressings     Were giving you a prescription for a topical steroid cream  You can use it twice a day after you wash your hands  You can use the cream until it improves, no longer than 2 weeks  Thank you for coming to the [place] [address] wound management center in [facility]       Standing Status:   Future     Standing Expiration Date:   7/16/2022

## 2021-07-16 NOTE — PROGRESS NOTES
Patient ID: Richard Navarro is a 24 y o  male Date of Birth 2000       Chief Complaint   Patient presents with    New Patient Visit     left leg wound about 6 months ago caused from a spider bite        Allergies:  Cashew nut oil - food allergy, Nuts - food allergy, and Pistachio nut extract skin test - food allergy    Diagnosis:      Diagnosis ICD-10-CM Associated Orders   1  Allergic reaction to spider bite, undetermined intent, sequela  T63 304S Wound cleansing and dressings           Assessment :   History of spider bite 4 months ago now with minimal remaining induration and irritation on the left calf  No open area  Plan:   Triamcinolone cream twice a day for up to two weeks  Discharge from wound center      Subjective:   7/16/21: This is a 23-year-old male who comes to the wound center because of a 4 month history of spider bite of the left posterior calf  Over the few  months it has finally closed but he notes a small area of induration  He had been seen in wound care in March and April this year  He has no pain or itch  No other symptoms  Past medical history is noncontributory  The following portions of the patient's history were reviewed and updated as appropriate:   Patient Active Problem List   Diagnosis    Open wound of left lower extremity     History reviewed  No pertinent past medical history  Past Surgical History:   Procedure Laterality Date    NO PAST SURGERIES      LA OTOPLASTY PROTRUDING EAR W/WO SIZE RDCTJ Bilateral 9/24/2020    Procedure: OTOPLASTY;  Surgeon: Kaylyn Damon MD;  Location: [facility];  Service: Plastics     History reviewed  No pertinent family history     Social History     Socioeconomic History    Marital status: Single     Spouse name: None    Number of children: 0    Years of education: None    Highest education level: Some college, no degree   Occupational History    Occupation: student   Tobacco Use    Smoking status: Never Smoker    Smokeless tobacco: Never Used   Vaping Use    Vaping Use: Never used   Substance and Sexual Activity    Alcohol use: Never    Drug use: Never    Sexual activity: Not Currently   Other Topics Concern    None   Social History Narrative    None     Social Determinants of Health     Financial Resource Strain:     Difficulty of Paying Living Expenses:    Food Insecurity:     Worried About Running Out of Food in the Last Year:     Ran Out of Food in the Last Year:    Transportation Needs:     Lack of Transportation (Medical):  Lack of Transportation (Non-Medical):    Physical Activity: Sufficiently Active    Days of Exercise per Week: 7 days    Minutes of Exercise per Session: 60 min   Stress: No Stress Concern Present    Feeling of Stress : Not at all   Social Connections:     Frequency of Communication with Friends and Family:     Frequency of Social Gatherings with Friends and Family:     Attends Taoist Services:     Active Member of Clubs or Organizations:     Attends Club or Organization Meetings:     Marital Status:    Intimate Partner Violence:     Fear of Current or Ex-Partner:     Emotionally Abused:     Physically Abused:     Sexually Abused:         Current Outpatient Medications:     EPINEPHrine (EPIPEN) 0 3 mg/0 3 mL SOAJ, Inject 0 3 mL (0 3 mg total) into a muscle once for 1 dose, Disp: 2 each, Rfl: 1    Review of Systems   Constitutional: Negative for appetite change, chills, fatigue, fever and unexpected weight change  HENT: Negative for congestion, hearing loss, postnasal drip, sinus pressure and sneezing  Eyes: Negative for discharge and visual disturbance  Respiratory: Negative for cough and shortness of breath  Cardiovascular: Negative for chest pain, palpitations and leg swelling  Gastrointestinal: Negative for abdominal pain, blood in stool, constipation, diarrhea and nausea  Endocrine: Negative      Genitourinary: Negative for difficulty urinating, dysuria and urgency  Musculoskeletal: Negative for back pain and gait problem  Skin: Positive for wound (Left calf without any actual opening  )  Negative for rash  Allergic/Immunologic: Negative  Neurological: Negative for dizziness, tremors, seizures, weakness, numbness and headaches  Hematological: Does not bruise/bleed easily  Psychiatric/Behavioral: Negative  Negative for agitation and dysphoric mood  The patient is not nervous/anxious  Objective:  /74   Pulse 66   Temp 97 7 °F (36 5 °C)   Resp 18   Ht 6' 9" (2 057 m)   Wt 105 kg (230 lb 13 2 oz)   BMI 24 73 kg/m²   Pain Score: 0-No pain     Physical Exam  Vitals and nursing note reviewed  Constitutional:       General: He is not in acute distress  Appearance: Normal appearance  He is well-developed and normal weight  He is not ill-appearing, toxic-appearing or diaphoretic  HENT:      Head: Normocephalic and atraumatic  Right Ear: External ear normal       Left Ear: External ear normal       Mouth/Throat:      Comments: masked  Eyes:      General: Lids are normal          Right eye: No discharge  Left eye: No discharge  Conjunctiva/sclera: Conjunctivae normal       Pupils: Pupils are equal, round, and reactive to light  Cardiovascular:      Rate and Rhythm: Normal rate and regular rhythm  Heart sounds: Normal heart sounds  No murmur heard  No friction rub  No gallop  Pulmonary:      Effort: Pulmonary effort is normal  No respiratory distress  Breath sounds: Normal breath sounds and air entry  Abdominal:      General: Abdomen is flat  Palpations: Abdomen is soft  There is no hepatomegaly or splenomegaly  Tenderness: There is no abdominal tenderness  There is no guarding or rebound  Musculoskeletal:      Cervical back: Neck supple  Right lower leg: No edema  Left lower leg: No edema  Lymphadenopathy:      Cervical: No cervical adenopathy     Skin:     General: Skin is warm and dry  Findings: Wound present  No abscess or erythema  Comments: Area of scarring with central induration  Pink coloration  No drainage and no actual open area  Neurological:      Mental Status: He is alert and oriented to person, place, and time  Gait: Gait is intact  Gait normal    Psychiatric:         Attention and Perception: Attention normal          Mood and Affect: Mood and affect normal          Speech: Speech normal          Behavior: Behavior normal  Behavior is cooperative  Cognition and Memory: Cognition normal              Wound 07/16/21 Traumatic Leg Left;Posterior (Active)   Wound Image Images linked 07/16/21 1303   Wound Description Epithelialization 07/16/21 1304   Amanda-wound Assessment Induration 07/16/21 1304   Wound Length (cm) 0 cm 07/16/21 1304   Wound Width (cm) 0 cm 07/16/21 1304   Wound Depth (cm) 0 cm 07/16/21 1304   Wound Surface Area (cm^2) 0 cm^2 07/16/21 1304   Wound Volume (cm^3) 0 cm^3 07/16/21 1304   Calculated Wound Volume (cm^3) 0 cm^3 07/16/21 1304   Drainage Amount None 07/16/21 1304               Wound Instructions:  Orders Placed This Encounter   Procedures    Wound cleansing and dressings     Were giving you a prescription for a topical steroid cream  You can use it twice a day after you wash your hands  You can use the cream until it improves, no longer than 2 weeks  Thank you for coming to the [place] [address] wound management center in [facility]  Standing Status:   Future     Standing Expiration Date:   7/16/2022       Total time spent today:  17 minutes  This includes reviewing the patient's chart, pertinent physician records including wound care notes from March in April of this year  Emilia Fu MD, CHT, CWS    Portions of the record may have been created with voice recognition software  Occasional wrong word or "sound alike" substitutions may have occurred due to the inherent limitations of voice recognition software  Read the chart carefully and recognize, using context, where substitutions have occurred

## 2021-10-14 ENCOUNTER — TELEPHONE (OUTPATIENT)
Dept: FAMILY MEDICINE CLINIC | Facility: CLINIC | Age: 21
End: 2021-10-14

## 2021-11-29 ENCOUNTER — TELEPHONE (OUTPATIENT)
Dept: FAMILY MEDICINE CLINIC | Facility: CLINIC | Age: 21
End: 2021-11-29

## 2021-12-15 ENCOUNTER — OFFICE VISIT (OUTPATIENT)
Dept: FAMILY MEDICINE CLINIC | Facility: CLINIC | Age: 21
End: 2021-12-15
Payer: COMMERCIAL

## 2021-12-15 VITALS
TEMPERATURE: 97.9 F | BODY MASS INDEX: 27.77 KG/M2 | HEIGHT: 78 IN | DIASTOLIC BLOOD PRESSURE: 74 MMHG | HEART RATE: 68 BPM | WEIGHT: 240 LBS | SYSTOLIC BLOOD PRESSURE: 126 MMHG

## 2021-12-15 DIAGNOSIS — K40.90 RIGHT INGUINAL HERNIA: ICD-10-CM

## 2021-12-15 DIAGNOSIS — Z00.00 ANNUAL PHYSICAL EXAM: Primary | ICD-10-CM

## 2021-12-15 DIAGNOSIS — N50.89 MASS OF LEFT TESTIS: ICD-10-CM

## 2021-12-15 PROBLEM — S81.802A OPEN WOUND OF LEFT LOWER EXTREMITY: Status: RESOLVED | Noted: 2021-03-25 | Resolved: 2021-12-15

## 2021-12-15 PROCEDURE — 99395 PREV VISIT EST AGE 18-39: CPT | Performed by: FAMILY MEDICINE

## 2021-12-15 PROCEDURE — 99214 OFFICE O/P EST MOD 30 MIN: CPT | Performed by: FAMILY MEDICINE

## 2021-12-20 ENCOUNTER — HOSPITAL ENCOUNTER (OUTPATIENT)
Dept: RADIOLOGY | Facility: HOSPITAL | Age: 21
Discharge: HOME | End: 2021-12-20
Payer: COMMERCIAL

## 2021-12-20 DIAGNOSIS — K40.90 RIGHT INGUINAL HERNIA: ICD-10-CM

## 2021-12-20 DIAGNOSIS — N50.89 MASS OF LEFT TESTIS: ICD-10-CM

## 2021-12-20 PROCEDURE — 76870 US EXAM SCROTUM: CPT

## 2021-12-21 ENCOUNTER — TELEPHONE (OUTPATIENT)
Dept: UROLOGY | Facility: AMBULATORY SURGERY CENTER | Age: 21
End: 2021-12-21

## 2021-12-22 ENCOUNTER — CONSULT (OUTPATIENT)
Dept: SURGERY | Facility: CLINIC | Age: 21
End: 2021-12-22
Payer: COMMERCIAL

## 2021-12-22 VITALS
WEIGHT: 242 LBS | TEMPERATURE: 98.1 F | BODY MASS INDEX: 28 KG/M2 | SYSTOLIC BLOOD PRESSURE: 114 MMHG | HEIGHT: 78 IN | DIASTOLIC BLOOD PRESSURE: 76 MMHG

## 2021-12-22 DIAGNOSIS — K40.90 RIGHT INGUINAL HERNIA: Primary | ICD-10-CM

## 2021-12-22 PROCEDURE — 99243 OFF/OP CNSLTJ NEW/EST LOW 30: CPT | Performed by: SURGERY

## 2021-12-27 ENCOUNTER — OFFICE VISIT (OUTPATIENT)
Dept: UROLOGY | Facility: CLINIC | Age: 21
End: 2021-12-27
Payer: COMMERCIAL

## 2021-12-27 ENCOUNTER — APPOINTMENT (OUTPATIENT)
Dept: LAB | Facility: AMBULARY SURGERY CENTER | Age: 21
End: 2021-12-27
Payer: COMMERCIAL

## 2021-12-27 VITALS — HEART RATE: 82 BPM | HEIGHT: 78 IN | RESPIRATION RATE: 18 BRPM | WEIGHT: 246 LBS | BODY MASS INDEX: 28.46 KG/M2

## 2021-12-27 DIAGNOSIS — D49.59 TESTICULAR TUMOR: Primary | ICD-10-CM

## 2021-12-27 DIAGNOSIS — N50.89 TESTICULAR MASS: ICD-10-CM

## 2021-12-27 LAB
AFP-TM SERPL-MCNC: 2.1 NG/ML (ref 0.5–8)
HCG-TM SERPL-SCNC: <2 MLU/ML
LDH SERPL-CCNC: 148 U/L (ref 81–234)

## 2021-12-27 PROCEDURE — 1036F TOBACCO NON-USER: CPT | Performed by: PHYSICIAN ASSISTANT

## 2021-12-27 PROCEDURE — 99203 OFFICE O/P NEW LOW 30 MIN: CPT | Performed by: PHYSICIAN ASSISTANT

## 2021-12-27 PROCEDURE — 3008F BODY MASS INDEX DOCD: CPT | Performed by: PHYSICIAN ASSISTANT

## 2021-12-27 PROCEDURE — 36415 COLL VENOUS BLD VENIPUNCTURE: CPT

## 2021-12-27 PROCEDURE — 83615 LACTATE (LD) (LDH) ENZYME: CPT

## 2021-12-27 PROCEDURE — 84702 CHORIONIC GONADOTROPIN TEST: CPT

## 2021-12-27 PROCEDURE — 82105 ALPHA-FETOPROTEIN SERUM: CPT

## 2021-12-27 NOTE — H&P (VIEW-ONLY)
UROLOGY CONSULTATION NOTE     Patient Identifiers: Sammi Downs (MRN: [id number])  Service Requesting Consultation:Referral Self   Service Providing Consultation:  Urology, Ovidio Murrell PA-C  Consults  Date of Service: 12/27/2021    Reason for Consultation:   Left testicular mass    History of Present Illness:     Sammi Downs is a 22 y o  Male accompanied by his father presents for evaluation of left testicular mass  He noticed this along with a moderate-sized right inguinal hernia about 2 months ago  He saw General surgery on December 22nd to address his inguinal hernia  He comes in today for evaluation by Urology  Testicular ultrasound shows a 1 3 x 1 3 x 1 2 cm left testicular mass  This is easily palpable on examination  He denies any pain, weight loss, headache, blurred vision or cough  Tumor markers were ordered but are not completed  He denies any family history of testicular cancer  Past Medical, Past Surgical History:   History reviewed  No pertinent past medical history :    Past Surgical History:   Procedure Laterality Date    NO PAST SURGERIES      AR OTOPLASTY PROTRUDING EAR W/WO SIZE RDCTJ Bilateral 9/24/2020    Procedure: OTOPLASTY;  Surgeon: Carol Sabillon MD;  [facility];  Service: Plastics   :    Medications, Allergies:     Current Outpatient Medications:     EPINEPHrine (EPIPEN) 0 3 mg/0 3 mL SOAJ, Inject 0 3 mL (0 3 mg total) into a muscle once for 1 dose, Disp: 2 each, Rfl: 1    triamcinolone (KENALOG) 0 1 % cream, Apply topically 2 (two) times a day (Patient not taking: Reported on 12/22/2021 ), Disp: 30 g, Rfl: 0    Allergies:   Allergies   Allergen Reactions    Cashew Nut Oil - Food Allergy Anaphylaxis    Nuts - Food Allergy Anaphylaxis     CASHEW/ PISTACHIO    Pistachio Nut Extract Skin Test - Food Allergy Anaphylaxis   :    Social and Family History:   Social History:   Social History     Tobacco Use    Smoking status: Never Smoker    Smokeless tobacco: Never Used   Vaping Use    Vaping Use: Never used   Substance Use Topics    Alcohol use: Never    Drug use: Never     Social History     Tobacco Use   Smoking Status Never Smoker   Smokeless Tobacco Never Used       Family History:  History reviewed  No pertinent family history :     Review of Systems:     General: Fever, chills, or night sweats: negative  Cardiac: Negative for chest pain  Pulmonary: Negative for shortness of breath  Gastrointestinal: Abdominal pain negative  Nausea, vomiting, or diarrhea negative,  Genitourinary: See HPI above  Patient does not have hematuria  All other systems queried were negative  Physical Exam:   General: Patient is pleasant and in NAD  Awake and alert  Pulse 82   Resp 18   Ht 6' 9" (2 057 m)   Wt 112 kg (246 lb)   BMI 26 36 kg/m²   HEENT:  Conjunctiva are clear  Constitutional:  pleasant and cooperative     no apparent distress  Cardiac: Peripheral edema: negative  Pulmonary: Non-labored breathing  Abdomen: Soft, non-tender, non-distended  No surgical scars  No masses, tenderness, hernias noted  Genitourinary: Negative CVA tenderness, negative suprapubic tenderness  Extremities: moves all extremities   No inguinal adenopathy  Neurological:CNII-XII intact  No numbness or tingling  Essentially non focal neurologic exam  Psychiatric:mood affect and behavior normal    (Male): Penis circumcised, phallus normal, meatus patent  Testicles descended into scrotum  Hard palpable mass extending from the lower portion of left testicle  Moderate-sized right inguinal hernia extending into the right scrotum  Labs:   No results found for: HGB, HCT, WBC, PLT]    No results found for: NA, K, CL, CO2, BUN, CREATININE, CALCIUM, GLUCOSE]    Imaging:   I personally reviewed the images and report of the following studies, and reviewed them with the patient:  SCROTAL ULTRASOUND   IMPRESSION:     1  No evidence of a right inguinal hernia    2  1 3 cm left testicular mass  Urologic consultation recommended      ASSESSMENT:       1  Hard left testicular mass   2  Moderate-sized right inguinal hernia  PLAN:   - long discussion with the patient and his father  - a hard fixed  palpable testicular mass would be testicular cancer until proven otherwise  - recommend laboratory studies for tumor markers followed by inguinal orchiectomy  - we discussed position for prosthetics and they have decided to pursue it implant at the time the surgery  - a CT scan chest abdomen and pelvis would be required once surgical pathology is obtained  - all questions are answered and I will call his father with the laboratory results when available      Thank you for allowing me to participate in this patients care  Please do not hesitate to call with any additional questions    Elana Pineda PA-C

## 2021-12-28 ENCOUNTER — TELEPHONE (OUTPATIENT)
Dept: UROLOGY | Facility: AMBULATORY SURGERY CENTER | Age: 21
End: 2021-12-28

## 2021-12-30 NOTE — PRE-PROCEDURE INSTRUCTIONS
No outpatient medications have been marked as taking for the 1/4/22 encounter [facility] Encounter)  Covid screening negative as per patient  Fully vaccinated  Reviewed pre op medicine and showering instructions with patient via phone call, verbalizes understanding  Advised to not take NSAID's 3 days pre op but may take Tylenol products if needed       Advised NPO after MN (1/3) and surgical services will call (1/3) with scheduled time of hospital arrival

## 2022-01-03 ENCOUNTER — ANESTHESIA EVENT (OUTPATIENT)
Dept: PERIOP | Facility: HOSPITAL | Age: 22
End: 2022-01-03
Payer: COMMERCIAL

## 2022-01-03 ENCOUNTER — TELEPHONE (OUTPATIENT)
Dept: UROLOGY | Facility: CLINIC | Age: 22
End: 2022-01-03

## 2022-01-03 NOTE — TELEPHONE ENCOUNTER
I called patient to introduce myself and discuss left orchiectomy tomorrow  I told him the prosthetic likely will not be available and usually I wait to perform insertion at a later date after whatever needs to be done for testicular cancer has been addressed  Patient voiced understanding of this  He denies any so questions regarding surgery  He has not had a CT scan yet    He has a inguinal hernia on the right side but nothing appreciated on the left and hopefully no hernia present because otherwise could make surgery difficult

## 2022-01-04 ENCOUNTER — TELEPHONE (OUTPATIENT)
Dept: OTHER | Facility: HOSPITAL | Age: 22
End: 2022-01-04

## 2022-01-04 ENCOUNTER — HOSPITAL ENCOUNTER (OUTPATIENT)
Facility: HOSPITAL | Age: 22
Setting detail: OUTPATIENT SURGERY
Discharge: HOME | End: 2022-01-04
Attending: UROLOGY | Admitting: UROLOGY
Payer: COMMERCIAL

## 2022-01-04 ENCOUNTER — ANESTHESIA (OUTPATIENT)
Dept: PERIOP | Facility: HOSPITAL | Age: 22
End: 2022-01-04
Payer: COMMERCIAL

## 2022-01-04 VITALS
WEIGHT: 246.91 LBS | DIASTOLIC BLOOD PRESSURE: 66 MMHG | TEMPERATURE: 97.7 F | RESPIRATION RATE: 16 BRPM | HEART RATE: 84 BPM | SYSTOLIC BLOOD PRESSURE: 167 MMHG | BODY MASS INDEX: 28.57 KG/M2 | OXYGEN SATURATION: 100 % | HEIGHT: 78 IN

## 2022-01-04 DIAGNOSIS — D29.22: Primary | ICD-10-CM

## 2022-01-04 DIAGNOSIS — D49.59 TESTICULAR TUMOR: Primary | ICD-10-CM

## 2022-01-04 PROCEDURE — 88331 PATH CONSLTJ SURG 1 BLK 1SPC: CPT | Performed by: PATHOLOGY

## 2022-01-04 PROCEDURE — 88305 TISSUE EXAM BY PATHOLOGIST: CPT | Performed by: PATHOLOGY

## 2022-01-04 PROCEDURE — 54522 ORCHIECTOMY PARTIAL: CPT | Performed by: UROLOGY

## 2022-01-04 RX ORDER — MAGNESIUM HYDROXIDE 1200 MG/15ML
LIQUID ORAL AS NEEDED
Status: DISCONTINUED | OUTPATIENT
Start: 2022-01-04 | End: 2022-01-04 | Stop reason: HOSPADM

## 2022-01-04 RX ORDER — BUPIVACAINE HYDROCHLORIDE 5 MG/ML
INJECTION, SOLUTION PERINEURAL AS NEEDED
Status: DISCONTINUED | OUTPATIENT
Start: 2022-01-04 | End: 2022-01-04 | Stop reason: HOSPADM

## 2022-01-04 RX ORDER — HYDROCODONE BITARTRATE AND ACETAMINOPHEN 5; 325 MG/1; MG/1
1 TABLET ORAL EVERY 4 HOURS PRN
Status: DISCONTINUED | OUTPATIENT
Start: 2022-01-04 | End: 2022-01-04 | Stop reason: HOSPADM

## 2022-01-04 RX ORDER — SODIUM CHLORIDE, SODIUM LACTATE, POTASSIUM CHLORIDE, CALCIUM CHLORIDE 600; 310; 30; 20 MG/100ML; MG/100ML; MG/100ML; MG/100ML
100 INJECTION, SOLUTION INTRAVENOUS CONTINUOUS
Status: DISCONTINUED | OUTPATIENT
Start: 2022-01-04 | End: 2022-01-04 | Stop reason: HOSPADM

## 2022-01-04 RX ORDER — FENTANYL CITRATE 50 UG/ML
INJECTION, SOLUTION INTRAMUSCULAR; INTRAVENOUS AS NEEDED
Status: DISCONTINUED | OUTPATIENT
Start: 2022-01-04 | End: 2022-01-04

## 2022-01-04 RX ORDER — MIDAZOLAM HYDROCHLORIDE 2 MG/2ML
INJECTION, SOLUTION INTRAMUSCULAR; INTRAVENOUS AS NEEDED
Status: DISCONTINUED | OUTPATIENT
Start: 2022-01-04 | End: 2022-01-04

## 2022-01-04 RX ORDER — HYDROCODONE BITARTRATE AND ACETAMINOPHEN 5; 325 MG/1; MG/1
1 TABLET ORAL EVERY 6 HOURS PRN
Qty: 15 TABLET | Refills: 0 | Status: SHIPPED | OUTPATIENT
Start: 2022-01-04 | End: 2022-01-14

## 2022-01-04 RX ORDER — SODIUM CHLORIDE, SODIUM LACTATE, POTASSIUM CHLORIDE, CALCIUM CHLORIDE 600; 310; 30; 20 MG/100ML; MG/100ML; MG/100ML; MG/100ML
125 INJECTION, SOLUTION INTRAVENOUS CONTINUOUS
Status: DISCONTINUED | OUTPATIENT
Start: 2022-01-04 | End: 2022-01-04 | Stop reason: HOSPADM

## 2022-01-04 RX ORDER — FENTANYL CITRATE/PF 50 MCG/ML
50 SYRINGE (ML) INJECTION
Status: DISCONTINUED | OUTPATIENT
Start: 2022-01-04 | End: 2022-01-04 | Stop reason: HOSPADM

## 2022-01-04 RX ORDER — HYDROMORPHONE HCL/PF 1 MG/ML
0.5 SYRINGE (ML) INJECTION
Status: DISCONTINUED | OUTPATIENT
Start: 2022-01-04 | End: 2022-01-04 | Stop reason: HOSPADM

## 2022-01-04 RX ORDER — PROPOFOL 10 MG/ML
INJECTION, EMULSION INTRAVENOUS AS NEEDED
Status: DISCONTINUED | OUTPATIENT
Start: 2022-01-04 | End: 2022-01-04

## 2022-01-04 RX ORDER — HYDROMORPHONE HCL/PF 1 MG/ML
SYRINGE (ML) INJECTION AS NEEDED
Status: DISCONTINUED | OUTPATIENT
Start: 2022-01-04 | End: 2022-01-04

## 2022-01-04 RX ORDER — ONDANSETRON 2 MG/ML
INJECTION INTRAMUSCULAR; INTRAVENOUS AS NEEDED
Status: DISCONTINUED | OUTPATIENT
Start: 2022-01-04 | End: 2022-01-04

## 2022-01-04 RX ORDER — MORPHINE SULFATE 4 MG/ML
2 INJECTION, SOLUTION INTRAMUSCULAR; INTRAVENOUS EVERY 2 HOUR PRN
Status: DISCONTINUED | OUTPATIENT
Start: 2022-01-04 | End: 2022-01-04 | Stop reason: HOSPADM

## 2022-01-04 RX ORDER — KETOROLAC TROMETHAMINE 30 MG/ML
INJECTION, SOLUTION INTRAMUSCULAR; INTRAVENOUS AS NEEDED
Status: DISCONTINUED | OUTPATIENT
Start: 2022-01-04 | End: 2022-01-04

## 2022-01-04 RX ORDER — OXYCODONE HYDROCHLORIDE 5 MG/1
10 TABLET ORAL EVERY 4 HOURS PRN
Status: DISCONTINUED | OUTPATIENT
Start: 2022-01-04 | End: 2022-01-04 | Stop reason: HOSPADM

## 2022-01-04 RX ORDER — CEFAZOLIN SODIUM 2 G/50ML
2000 SOLUTION INTRAVENOUS
Status: COMPLETED | OUTPATIENT
Start: 2022-01-04 | End: 2022-01-04

## 2022-01-04 RX ORDER — ONDANSETRON 2 MG/ML
4 INJECTION INTRAMUSCULAR; INTRAVENOUS ONCE AS NEEDED
Status: DISCONTINUED | OUTPATIENT
Start: 2022-01-04 | End: 2022-01-04 | Stop reason: HOSPADM

## 2022-01-04 RX ORDER — DEXAMETHASONE SODIUM PHOSPHATE 10 MG/ML
INJECTION, SOLUTION INTRAMUSCULAR; INTRAVENOUS AS NEEDED
Status: DISCONTINUED | OUTPATIENT
Start: 2022-01-04 | End: 2022-01-04

## 2022-01-04 RX ADMIN — FENTANYL CITRATE 50 MCG: 50 INJECTION, SOLUTION INTRAMUSCULAR; INTRAVENOUS at 13:46

## 2022-01-04 RX ADMIN — LIDOCAINE HYDROCHLORIDE 50 ML: 20 INJECTION, SOLUTION INTRAVENOUS at 13:41

## 2022-01-04 RX ADMIN — PROPOFOL 200 MG: 10 INJECTION, EMULSION INTRAVENOUS at 13:41

## 2022-01-04 RX ADMIN — PROPOFOL 100 MG: 10 INJECTION, EMULSION INTRAVENOUS at 13:43

## 2022-01-04 RX ADMIN — PROPOFOL 100 MG: 10 INJECTION, EMULSION INTRAVENOUS at 13:42

## 2022-01-04 RX ADMIN — MIDAZOLAM HYDROCHLORIDE 2 MG: 1 INJECTION, SOLUTION INTRAMUSCULAR; INTRAVENOUS at 13:36

## 2022-01-04 RX ADMIN — FENTANYL CITRATE 50 MCG: 50 INJECTION, SOLUTION INTRAMUSCULAR; INTRAVENOUS at 14:56

## 2022-01-04 RX ADMIN — ONDANSETRON 4 MG: 2 INJECTION INTRAMUSCULAR; INTRAVENOUS at 14:54

## 2022-01-04 RX ADMIN — PROPOFOL 50 MG: 10 INJECTION, EMULSION INTRAVENOUS at 15:16

## 2022-01-04 RX ADMIN — HYDROMORPHONE HYDROCHLORIDE 0.5 MG: 1 INJECTION, SOLUTION INTRAMUSCULAR; INTRAVENOUS; SUBCUTANEOUS at 14:03

## 2022-01-04 RX ADMIN — FENTANYL CITRATE 25 MCG: 50 INJECTION, SOLUTION INTRAMUSCULAR; INTRAVENOUS at 14:13

## 2022-01-04 RX ADMIN — SODIUM CHLORIDE, SODIUM LACTATE, POTASSIUM CHLORIDE, AND CALCIUM CHLORIDE: .6; .31; .03; .02 INJECTION, SOLUTION INTRAVENOUS at 15:23

## 2022-01-04 RX ADMIN — KETOROLAC TROMETHAMINE 15 MG: 30 INJECTION, SOLUTION INTRAMUSCULAR at 15:11

## 2022-01-04 RX ADMIN — FENTANYL CITRATE 50 MCG: 50 INJECTION, SOLUTION INTRAMUSCULAR; INTRAVENOUS at 14:01

## 2022-01-04 RX ADMIN — DEXAMETHASONE SODIUM PHOSPHATE 4 MG: 10 INJECTION, SOLUTION INTRAMUSCULAR; INTRAVENOUS at 13:46

## 2022-01-04 RX ADMIN — FENTANYL CITRATE 25 MCG: 50 INJECTION, SOLUTION INTRAMUSCULAR; INTRAVENOUS at 14:34

## 2022-01-04 RX ADMIN — PROPOFOL 50 MG: 10 INJECTION, EMULSION INTRAVENOUS at 15:18

## 2022-01-04 RX ADMIN — CEFAZOLIN SODIUM 2000 MG: 2 SOLUTION INTRAVENOUS at 13:45

## 2022-01-04 RX ADMIN — SODIUM CHLORIDE, SODIUM LACTATE, POTASSIUM CHLORIDE, AND CALCIUM CHLORIDE: .6; .31; .03; .02 INJECTION, SOLUTION INTRAVENOUS at 13:38

## 2022-01-04 RX ADMIN — PROPOFOL 50 MG: 10 INJECTION, EMULSION INTRAVENOUS at 15:14

## 2022-01-04 NOTE — ANESTHESIA POSTPROCEDURE EVALUATION
Post-Op Assessment Note    CV Status:  Stable  Pain Score: 0    Pain management: adequate     Mental Status:  Sleepy   Hydration Status:  Euvolemic   PONV Controlled:  Controlled   Airway Patency:  Patent      Post Op Vitals Reviewed: Yes      Staff: CRNA         No complications documented      BP   142/61   Temp   98 4   Pulse  76   Resp   14   SpO2   100% 4L fm

## 2022-01-04 NOTE — OP NOTE
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    Procedure(s) (LRB):  PARTIAL LEFT ORCHIECTOMY INGUINAL (Left)    Specimen(s):  ID Type Source Tests Collected by Time Destination   1 : left testicular mass  Tissue Mass TISSUE EXAM Jarocho Díaz MD 2022 1407        Estimated Blood Loss:   Minimal    Drains:  * No LDAs found *    Anesthesia Type:   General    Operative Indications:  23 yo male with 1 2cm testicular tumor that could be felt on exam and seen on US but with minimal bloodflow and negative tumor markers  Operative Findings:  1 2cm well cirumscribed tumor felt and seen on US near lower pole of testicle abutting tail of epididymis removed and frozen section was benign  Testicle pinked up after unclamping and blood flow heard with doppler at testicular hilum    Complications:   None    Procedure and Technique:     After the smooth induction of general LMA anesthesia, the patient was placed supine  His genitalia was prepped and draped in a sterile fashion  Intravenous antibiotics were administered  A timeout was performed with all members of the operative team and firm and the patient's identity, procedure to be performed, and laterality of the case  A #15 blade was then used to make a transverse skin incision in the left inguinal area  Electrocautery were utilized to dissect through the subcutaneous tissue until the external oblique fasica was identified  This was opened sharply with a #15 blade  There spermatic cord was encircled with a mason and dissected free of surrounding tissue to facilitate placement of a penrose drain doubly around the cord structure  The testicle was then delivered into the field   The gubernacular attachments were taken down with cautery with care not to violate the scrotum  The tunica vaginalis was opened  The testicle was examined  The lesion could be felt in the lower pole of the testicle abutting the tail of the epididymis  It was examined on ultrasound confirming its location  The Penrose drain encircling cord structure of testicle was tight and clamped to cut the blood supply the testicle  The tunic albuginea was sharply incised and the mass was excised using a combination of blunt and sharp dissection  It was white in nature and appeared well circumscribed  A small volume of normally appearing brown soft seminiferous tubules were resected in the process  The mass was immediately sent to pathology for frozen section  The tunic albuginea the testicle was closed with a running 2-0 Vicryl suture in watertight fashion  We then waited for frozen pathology results  While waiting for frozen results the field including the inner aspect of scrotum was carefully examined and hemostasis appeared adequate  As it was taking over 15 minutes I elected to unclamp the Penrose drain to facilitate blood flow to the testicle  The testicle appeared to pink up  A Doppler ultrasound was used and good flow was heard at the hilum of the testicle although difficult to appreciate good flow along the edge of the testicle  Frozen pathology returned showing lesion was consistent with epidermoid cyst and adjacent seminiferous tubules appeared to be more consistent with normal spermatogenesis rather than ICGN although difficult to tell because of artifact  Based on this the decision was made not to proceed with orchiectomy  The testicle cord structure was evaluated and appeared straight  The testicle was returned to its natural anatomic position  The cord was re-examined and again appeared straight  Spot electrocautery was used for hemostasis    The external oblique fascia was reapproximated with a running 2-0 Vicryl suture with care taken to avoid entrapment of the ilioinguinal nerve  The Filemon's fascia was closed with a 3-0 Vicryl suture in interrupted fashion  Local anesthetic was administered into the wound as well as the area near the cord structure  Skin was reapproximated with subcuticular running 4-0 Monocryl suture  Fluff gauze and a scrotal supporter were placed  Overall the patient tolerated the procedure well and were no complications  The patient was excavated in the operating room and transferred to the PACU in stable condition at the conclusion of the case  A qualified resident physician was not available    Patient Disposition:  PACU     PLAN:  The patient will have a follow-up visit in 2 weeks for wound check and recommend a follow-up ultrasound in 3 months  I have advised the patient's father and the patient that he is likely at high risk for testicular torsion because of the nature of surgery      SIGNATURE: Saul Roy MD  DATE: January 4, 2022  TIME: 3:21 PM

## 2022-01-04 NOTE — TELEPHONE ENCOUNTER
The patient had a partial left orchiectomy because frozen pathology during surgery returned as benign tumor  We will plan for a follow-up appointment in 2 weeks for wound check before he returns to college and then recommend he gets a repeat scrotal ultrasound in 3 months  I have advised him and his family he is at high risk testicular torsion on this side

## 2022-01-04 NOTE — INTERVAL H&P NOTE
H&P reviewed  After examining the patient I find no changes in the patients condition since the H&P had been written  Vitals:    01/04/22 1210   BP: 143/78   Pulse: 72   Resp: 18   Temp: 97 6 °F (36 4 °C)   SpO2: 100%     I reviewed pathology with the radiologist in hospital and based on this there is enough chance the left testicular lesion is benign that I think we should consider a partial orchiectomy with frozen section and completion orchiectomy if lesion is cancer  I spoke with pathology to make sure frozen section could be done (it can), also spoke with radiology US techs in case I need their help localizing the tumor in the operating room and they are on standby  Then communicated plan to OR  Then I sat down and spoke with the patient and his father again at length and discussed this plan (partial orchiectomy with possible radical ochiectomy, possible prosthesis) and they are comfortable with this plan  A new consent was signed

## 2022-01-04 NOTE — ANESTHESIA PREPROCEDURE EVALUATION
Procedure:  ORCHIECTOMY INGUINAL WITH PROSTHETICS (Left Groin)    Relevant Problems   No relevant active problems        Physical Exam    Airway    Mallampati score: I  TM Distance: >3 FB  Neck ROM: full     Dental   No notable dental hx     Cardiovascular  Cardiovascular exam normal    Pulmonary  Pulmonary exam normal     Other Findings        Anesthesia Plan  ASA Score- 1     Anesthesia Type- general with ASA Monitors  Additional Monitors:   Airway Plan: LMA  Plan Factors-Exercise tolerance (METS): >4 METS  Chart reviewed  Patient summary reviewed  Induction- intravenous  Postoperative Plan- Plan for postoperative opioid use  Planned trial extubation    Informed Consent- Anesthetic plan and risks discussed with patient  I personally reviewed this patient with the CRNA  Discussed and agreed on the Anesthesia Plan with the CRNA  Vianey Khan

## 2022-01-04 NOTE — TELEPHONE ENCOUNTER
Scheduled patient with Karlee Eng on 1/19/22, as Dr Pricilla Bolivar does not have an available 30 min appt in time frame  Is this OK?

## 2022-01-04 NOTE — DISCHARGE INSTRUCTIONS
Mr Wali Zuluaga,   Your surgery went well  We removed a portion of your left testicle and the patholgy was benign  The testicle appeared to have good blood flow after it was unclamped  Please take it very easy at home for the next 3 days with minimal walking around  After 3 days you can walk around as much as he would like but should not do any heavy lifting or running for 2 weeks  Try to wear supportive underwear for 1 week ("sporty"/spandex type boxer briefs)  Some bruising and swelling is normal   Please let us know if you are having excessive swelling the scrotum or at the incision site or significant pain  Most patients are able to control their pain adequately with Tylenol or Motrin  If needed you can use narcotic for breakthrough pain  Usually antibiotics are not needed after this surgery  Please call if you have questions or concerns ([phone])    Partial Orchiectomy   WHAT YOU NEED TO KNOW:   Partial Orchiectomy,  is surgery to remove a potion of the testicle  DISCHARGE INSTRUCTIONS:   Call your local emergency number (911 in the [address] [place] [address],[address]) if:   · You feel lightheaded and short of breath  · You have chest pain when you take a deep breath or cough  · You cough up blood  Seek care immediately if:   · You have severe pain or swelling in your legs  · You have lower abdominal or back pain that does not go away, even after you take pain medicine  · You have trouble urinating or having a bowel movement  · Your incision is swollen, red, bleeding, or has pus coming from it  · Your stitches come apart  · Your leg feels warm, tender, and painful  It may look swollen and red  Call your doctor or surgeon if:   · You have nausea or are vomiting  · You have a cough or feel weak and achy  · You have a fever or chills  · You have questions or concerns about your condition or care  Medicines:   You may need any of the following:  · Prescription pain medicine  may be given  Ask your healthcare provider how to take this medicine safely  Some prescription pain medicines contain acetaminophen  Do not take other medicines that contain acetaminophen without talking to your healthcare provider  Too much acetaminophen may cause liver damage  Prescription pain medicine may cause constipation  Ask your healthcare provider how to prevent or treat constipation  · Take your medicine as directed  Contact your healthcare provider if you think your medicine is not helping or if you have side effects  Tell him or her if you are allergic to any medicine  Keep a list of the medicines, vitamins, and herbs you take  Include the amounts, and when and why you take them  Bring the list or the pill bottles to follow-up visits  Carry your medicine list with you in case of an emergency  Care for the surgery area:   · You will have a bandage over your surgery site  Do not take the bandage off until your healthcare provider says it is okay  · You may need to wear a jock strap for support and comfort  Ask for more information about caring for your wound and using your jock strap  · Apply ice to your surgery area  Ice helps decrease swelling and pain  Ice may also help prevent tissue damage  Use an ice pack or put crushed ice in a plastic bag  Cover it with a towel, and place it on your incision for 15 to 20 minutes every hour as directed  You may be at higher risk for Testicular torsion because of this surgery:  Testicular Torsion   WHAT YOU NEED TO KNOW:   What is testicular torsion? Testicular torsion is a condition in which the spermatic cord that holds the testicle gets twisted  The spermatic cord contains blood vessels and passageways for sperm  When the spermatic cord is twisted, blood flow to the testicle is reduced or blocked  This condition usually happens to only one testicle, but can happen to both   It usually affects babies up to 3 year of age and children 15 to 25 years of age  What causes testicular torsion? The cause of this condition is not always known  A birth defect may cause it, and symptoms may only appear as you get older  You may have this condition if you play sports, exercise, or have an injury near your groin  Cold weather may also increase your risk  What are the signs and symptoms of testicular torsion? · Severe pain and tenderness in your scrotum    · Red and swollen scrotum    · Testicles that appear to hang a bit higher than usual    · Nausea and vomiting    · Fever    How is testicular torsion diagnosed? Your healthcare provider will do a physical examination  He may ask you questions about your health and your symptoms  You may need the following tests:  · Ultrasound: An ultrasound uses sound waves to show pictures on a monitor  An ultrasound may show problems in your testicles and spermatic cord, including abnormal blood flow  · Scintigraphy: This test uses radioactive dye to check for blood flow in the spermatic cord and scrotum  The dye helps the blood vessels show up better on the x-rays  Tell the healthcare provider if you have ever had an allergic reaction to contrast dye  How is testicular torsion treated? You must see a healthcare provider as soon as possible  Your healthcare provider may try to untwist the spermatic cord by hand  Your healthcare provider may also give you medicine to decrease any pain or swelling  If your healthcare provider cannot untwist it by hand, you may need surgery  Your healthcare provider may have to make an incision on your scrotum to reach and untwist the affected testicle  Your healthcare provider may then attach the affected testicle to the wall of your scrotum to prevent it from twisting again  The unaffected testicle may also be attached to the scrotum to prevent testicular torsion  What are the risks of testicular torsion? If you have surgery, you may bleed more than expected or get an infection   Even after treatment, your testicle may get smaller or have decreased sperm and hormone production  With or without treatment, the lack of blood flow to the testicle may lead to an infection  The testicle may shrink  The testicle may die and need to be removed completely  This may make it difficult for you to get a woman pregnant  If both testicles need to be removed, you will be sterile (unable to get a woman pregnant)  When should I contact my healthcare provider? · You have a fever  · Your skin is itchy, swollen, or has a rash  · You have questions or concerns about your condition or care  When should I seek immediate care? · You have increased pain, swelling, or redness in your scrotum  CARE AGREEMENT:   You have the right to help plan your care  Learn about your health condition and how it may be treated  Discuss treatment options with your healthcare providers to decide what care you want to receive  You always have the right to refuse treatment  The above information is an  only  It is not intended as medical advice for individual conditions or treatments  Talk to your doctor, nurse or pharmacist before following any medical regimen to see if it is safe and effective for you  © Copyright [employer] 2021 Information is for End User's use only and may not be sold, redistributed or otherwise used for commercial purposes   All illustrations and images included in CareNotes® are the copyrighted property of [employer]  or [facility] [employer]

## 2022-01-04 NOTE — INTERVAL H&P NOTE
H&P reviewed  After examining the patient I find no changes in the patients condition since the H&P had been written  Vitals:    01/04/22 1210   BP: 143/78   Pulse: 72   Resp: 18   Temp: 97 6 °F (36 4 °C)   SpO2: 100%     I saw and examined the patient  I can feel a hard area in the lower aspect of the left testicle  No hernia appreciated on the left side  There is an obvious inguinal hernia on the right  We discussed surgery and prosthetic  We specifically discussed risks of the left testicular lesion being benign and the potential need for further treatment based on pathology and CT scan  We discussed risks of prosthetic to include infection, pain, scarring (riding up) and need for potential removal down the line  Consent was already signed but was updated

## 2022-01-05 ENCOUNTER — TELEPHONE (OUTPATIENT)
Dept: OTHER | Facility: OTHER | Age: 22
End: 2022-01-05

## 2022-01-05 DIAGNOSIS — N44.2 TESTICULAR CYST: Primary | ICD-10-CM

## 2022-01-05 NOTE — TELEPHONE ENCOUNTER
Mother called with directions for post op procedure where not clear  As stated on paperwork they were to wait with further instruction from our office before removing the dressing

## 2022-01-05 NOTE — TELEPHONE ENCOUNTER
[address] [facility] 6 minutes ago (11:47 AM)     KP    Yes, can remove dressing and shower  Do not submerge in bath etc  Pat area dry  Apply guaze and compressive undergarments/jockstrap/scrotal support  No heavy lifting etc      Routing comment       Rachel Pickard [id number] [facility] Provider 1 hour ago (10:48 AM)     RG    Is patient allowed to remove dressing and shower this morning? Routing comment      Yaritza Diaz RN 1 hour ago (10:43 AM)     RG    Summary: post-op question       Mother called with directions for post op procedure where not clear   As stated on paperwork they were to wait with further instruction from our office before removing the dressing                Documentation      ISELA Pickard 1 hour ago (10:14 AM)     Valencia Oliva routed conversation to [facility] Triage 1 hour ago (10:10 AM)     Valencia Oliva 1 hour ago (10:08 AM)     TK       Mom is calling to inquire about jockstrap, showering, bandage removal, etc post op; he had surgery yesterday 1/4/22         Documentation

## 2022-01-05 NOTE — TELEPHONE ENCOUNTER
Post Op Note    Milagros Wilder is a 22 y o  male s/p partial left orichiectomy performed 01/05/2022  Milagros Wilder is a patient of Dr Pricilla Bolivar and is seen at the [facility] office  How would you rate your pain on a scale from 1 to 10, 10 being the worst pain ever? 0  Have you had a fever? No  Have your bowel movements been regular? No  Do you have any difficulty urinating? No  If the patient has an incision- do you have any redness around the incision or any drainage from the incision? Has not taken off bandage yet  Warned to look for signs of infection and call as needed   Do you have any other questions or concerns that I can address at this time? No further questions, advised to call office if having any further issue

## 2022-01-05 NOTE — TELEPHONE ENCOUNTER
Mom is calling to inquire about jockstrap, showering, bandage removal, etc post op; he had surgery yesterday 1/4/22

## 2022-01-19 ENCOUNTER — OFFICE VISIT (OUTPATIENT)
Dept: UROLOGY | Facility: CLINIC | Age: 22
End: 2022-01-19

## 2022-01-19 VITALS
SYSTOLIC BLOOD PRESSURE: 120 MMHG | BODY MASS INDEX: 27.95 KG/M2 | WEIGHT: 241.6 LBS | DIASTOLIC BLOOD PRESSURE: 68 MMHG | HEIGHT: 78 IN | HEART RATE: 87 BPM

## 2022-01-19 DIAGNOSIS — N44.2 TESTICULAR CYST: Primary | ICD-10-CM

## 2022-01-19 PROCEDURE — 3008F BODY MASS INDEX DOCD: CPT | Performed by: PHYSICIAN ASSISTANT

## 2022-01-19 PROCEDURE — 99024 POSTOP FOLLOW-UP VISIT: CPT | Performed by: PHYSICIAN ASSISTANT

## 2022-01-19 NOTE — PROGRESS NOTES
Assessment:     1  Postop partial left orchiectomy    Plan:  - seen with Dr Chiara Vickers  - follow-up in 6 months with scrotal ultrasound prior to visit    Doing well postoperatively  Wound care discussed  Subjective     Eula Acosta presents to the clinic 2 weeks status post  Partial left orchiectomy for post-op follow-up  The patient reports no problems with eating, bowel movements, voiding, or their wound  The patient is not having any pain       Objective     /68   Pulse 87   Ht 6' 9" (2 057 m)   Wt 110 kg (241 lb 9 6 oz)   BMI 25 89 kg/m²   General:  alert   Abdomen: soft, non-tender   Incision:   healing well, no dehiscence, incision well approximated some thickening of the cord on the left side no ecchymosis     Pathology:  Final Diagnosis   A  Testis, Left, left testicular mass, excision:  - Epidermoid cyst, 1 5 cm, examined margins uninvolved  - Adjacent seminiferous tubules with active spermatogenesis    - Negative for malignancy

## 2022-02-11 ENCOUNTER — NURSE TRIAGE (OUTPATIENT)
Dept: OTHER | Facility: OTHER | Age: 22
End: 2022-02-11

## 2022-02-11 NOTE — TELEPHONE ENCOUNTER
Regarding: Left swollen testicle   ----- Message from Zainab Garrett MA sent at 2/11/2022  3:51 PM EST -----  "My sons left testicle is swollen  "

## 2022-02-11 NOTE — TELEPHONE ENCOUNTER
Reason for Disposition   [1] Swollen scrotum now BUT [2] no pain or crying (Exception: swelling goes away when pushed on OR teen with painless mass in scrotum)    Answer Assessment - Initial Assessment Questions  1  APPEARANCE of SWELLING: "What does it look like?"      Large mass to left testicle    2  SIZE: "How big is it?" (inches, cm or compare to coins)      Compared to a small oval baked potato  3  LOCATION: "Where exactly is the swelling located?"      Left teste    4  PATTERN: "Does it come and go, or is it constant?"      If constant: "Is it getting better, staying the same, or worsening?"        If intermittent: "How long does it last?  Does your child have the swelling now?"      Just noticed this again last night  5  ONSET: "When did the swelling begin?"      See number 4      6  PAIN: "Is there any pain?" If so, ask: "How bad is it?" (consider rating on a scale of 1-10)      No pain    7  CAUSE: "What do you think is causing the swelling?"      Pt had left partial orchiectomy on 1/4    Protocols used: SCROTUM SWELLING OR PAIN-PEDIATRIC-    Per veda Galloway recommendation  Since Procedure was done over 4 wks ago- ER evaluation is recommended  Discussed w pt who verbalized understanding  Says he would need to go to ER closest to him  Is away at school and nearest [address] [place] [address] ER is 5 hrs away

## 2022-02-11 NOTE — TELEPHONE ENCOUNTER
Called and left message for patient's father to return call  When he or patient return call please advise providers recommendations include sooner scrotal US and follow up in the office  New order placed  Please assist with scheduling

## 2022-02-11 NOTE — TELEPHONE ENCOUNTER
Patient of Dr Sam Meckel int he [facility] office, known to practice for left testicular mass that ended up being benign  He had partial left orchiectomy 1/4/22 and was last seen on 1/19/22 for post op appt  Plan for 6 month f/u with US prior  Called and spoke with patient's father  He states his son called him yesterday and reported his right testicle is significantly smaller than it used to be  He states he was not having any pain, he was just concerned  Advised I would consult with providers and we would return call  Imaging or a visit may be recommended  He states his son is away at school so he is unsure what they can do while he is away  Advised we can discuss once we receive recommendations  He verbalized understanding

## 2022-02-14 NOTE — TELEPHONE ENCOUNTER
I called and spoke with the patient's father  The patient had progressive swelling around the left testicle since I saw him postoperatively (at which point he already had some swelling)  It does not appear was painful  They called our help line who advised the patient to go to the emergency room so this was done and ultrasound was obtained  I do not yet have a copy of the report but presumably it showed a hydrocele  I told the father that this could be reactive hydrocele and may get better on its own but could take weeks to months  It is also possible he has a prominent hydrocele as result of healing from surgery in which case we could offer drainage (but likely to recur) or hydrocelectomy which can be done here or at [facility]  Father voiced understanding  Plan for observation for the coming weeks/months and if not improving to contact us back  I also would like to see the ultrasound report and they are working on getting this this faxed over

## 2022-02-14 NOTE — TELEPHONE ENCOUNTER
Azalea Rojas, RN 3 days ago     TS       Regarding: Left swollen testicle   ----- Message from Alex Dumont MA sent at 2/11/2022  3:51 PM EST -----  "My sons left testicle is swollen  "        Desire Romero RN 3 days ago     AR          Reason for Disposition   [1] Swollen scrotum now BUT [2] no pain or crying (Exception: swelling goes away when pushed on OR teen with painless mass in scrotum)    Answer Assessment - Initial Assessment Questions  1  APPEARANCE of SWELLING: "What does it look like?"      Large mass to left testicle    2  SIZE: "How big is it?" (inches, cm or compare to coins)      Compared to a small oval baked potato  3  LOCATION: "Where exactly is the swelling located?"      Left teste    4  PATTERN: "Does it come and go, or is it constant?"      If constant: "Is it getting better, staying the same, or worsening?"        If intermittent: "How long does it last?  Does your child have the swelling now?"      Just noticed this again last night  5  ONSET: "When did the swelling begin?"      See number 4      6  PAIN: "Is there any pain?" If so, ask: "How bad is it?" (consider rating on a scale of 1-10)      No pain    7  CAUSE: "What do you think is causing the swelling?"      Pt had left partial orchiectomy on 1/4    Protocols used: SCROTUM SWELLING OR PAIN-PEDIATRIC-     Per veda Galloway recommendation  Since Procedure was done over 4 wks ago- ER evaluation is recommended  Discussed w pt who verbalized understanding  Says he would need to go to ER closest to him  Is away at school and nearest [facility] ER is 5 hrs away

## 2022-02-14 NOTE — TELEPHONE ENCOUNTER
Called and spoke with patient's father  He states patient went to [address] [place] [address] ER 2/11/22 and had US done  He states they told patient there was fluid build up in the scrotum and it would go away on its own  They did not state he needed any follow up  Patient's father states he called our office this morning and left a message but has yet to hear back  He would like Dr Allen Mckinnon to be made aware of situation and to provide recommendations  Advised I would loop in Dr Allen Mckinnon  Reviewed they should have US report faxed to our office at [phone]  He states he will work on that now

## 2022-05-10 ENCOUNTER — CONSULT (OUTPATIENT)
Dept: SURGERY | Facility: CLINIC | Age: 22
End: 2022-05-10
Payer: COMMERCIAL

## 2022-05-10 VITALS — BODY MASS INDEX: 31.12 KG/M2 | HEIGHT: 78 IN | TEMPERATURE: 97.4 F | WEIGHT: 269 LBS

## 2022-05-10 DIAGNOSIS — K40.90 RIGHT INGUINAL HERNIA: Primary | ICD-10-CM

## 2022-05-10 PROCEDURE — 99213 OFFICE O/P EST LOW 20 MIN: CPT | Performed by: SURGERY

## 2022-05-10 NOTE — PROGRESS NOTES
Office Visit - General Surgery  Jeremias Christine MRN: [id number]  Encounter: [id number]    Assessment and Plan  Problem List Items Addressed This Visit        Other    Right inguinal hernia - Primary     I discussed with he and his father repairing his right inguinal hernia  I discussed the procedure in detail including risks, benefits, alternatives, and what to expect postoperatively  He understands and is agreeable to go ahead  Plan:  Right inguinal hernia repair               Chief Complaint:  Jeremias Christine is a 22 y o  male who presents for Hernia (recheck right inguinal hernia )    Subjective  [de-identified] year old male with known right inguinal hernia back for scheduling  No new complaints since last visit  Notices bulging in the right groin region  No change in bowel or bladder habits  History reviewed  No pertinent past medical history      Past Surgical History:   Procedure Laterality Date    ME OTOPLASTY PROTRUDING EAR W/WO SIZE RDCTJ Bilateral 9/24/2020    Procedure: OTOPLASTY;  Surgeon: Calvin Thompson MD;  [facility];  Service: Plastics    ME REMOVAL TESTIS,SIMPLE Left 1/4/2022    Procedure: PARTIAL LEFT ORCHIECTOMY INGUINAL;  Surgeon: Emanuel Todd MD;  [facility];  Service: Urology       Family History   Problem Relation Age of Onset    No Known Problems Father     No Known Problems Mother        Social History     Tobacco Use    Smoking status: Never Smoker    Smokeless tobacco: Never Used   Vaping Use    Vaping Use: Never used   Substance Use Topics    Alcohol use: Never    Drug use: Never        Medications  Current Outpatient Medications on File Prior to Visit   Medication Sig Dispense Refill    EPINEPHrine (EPIPEN) 0 3 mg/0 3 mL SOAJ Inject 0 3 mL (0 3 mg total) into a muscle once for 1 dose 2 each 1    triamcinolone (KENALOG) 0 1 % cream Apply topically 2 (two) times a day (Patient not taking: Reported on 12/22/2021 ) 30 g 0     No current facility-administered medications on file prior to visit  Allergies  Allergies   Allergen Reactions    Cashew Nut Oil - Food Allergy Anaphylaxis    Nuts - Food Allergy Anaphylaxis     CASHEW/ PISTACHIO    Pistachio Nut Extract Skin Test - Food Allergy Anaphylaxis       Review of Systems   Constitutional: Negative for chills, fatigue and fever  HENT: Negative for congestion, ear pain, sneezing, trouble swallowing and voice change  Eyes: Negative for pain and discharge  Respiratory: Negative for cough, shortness of breath and wheezing  Cardiovascular: Negative for palpitations and leg swelling  Gastrointestinal: Negative for abdominal pain, constipation, diarrhea, nausea and vomiting  Endocrine: Negative for cold intolerance, heat intolerance and polyuria  Genitourinary: Negative for decreased urine volume, difficulty urinating and dysuria  Musculoskeletal: Negative for arthralgias and back pain  Skin: Negative for rash and wound  Allergic/Immunologic: Negative for environmental allergies and food allergies  Neurological: Negative for dizziness and weakness  Hematological: Negative for adenopathy  Does not bruise/bleed easily  Psychiatric/Behavioral: Negative for confusion and sleep disturbance  The patient is not nervous/anxious  All other systems reviewed and are negative  Objective  Vitals:    05/10/22 1002   Temp: (!) 97 4 °F (36 3 °C)       Physical Exam  Vitals and nursing note reviewed  Constitutional:       General: He is not in acute distress  Appearance: He is well-developed  He is not diaphoretic  HENT:      Head: Normocephalic and atraumatic  Right Ear: External ear normal       Left Ear: External ear normal    Eyes:      General: No scleral icterus  Conjunctiva/sclera: Conjunctivae normal    Neck:      Thyroid: No thyromegaly  Trachea: No tracheal deviation  Cardiovascular:      Rate and Rhythm: Normal rate and regular rhythm        Heart sounds: Normal heart sounds  No murmur heard  Pulmonary:      Effort: Pulmonary effort is normal  No respiratory distress  Breath sounds: Normal breath sounds  No wheezing or rales  Abdominal:      General: There is no distension  Palpations: Abdomen is soft  There is no mass  Tenderness: There is no abdominal tenderness  There is no guarding or rebound  Comments: Left groin incision well healed  Right inguinal hernia present and reducible   Musculoskeletal:         General: Normal range of motion  Cervical back: Normal range of motion and neck supple  Lymphadenopathy:      Cervical: No cervical adenopathy  Skin:     General: Skin is warm and dry  Neurological:      Mental Status: He is alert and oriented to person, place, and time  Psychiatric:         Behavior: Behavior normal          Thought Content:  Thought content normal          Judgment: Judgment normal

## 2022-05-10 NOTE — ASSESSMENT & PLAN NOTE
I discussed with he and his father repairing his right inguinal hernia  I discussed the procedure in detail including risks, benefits, alternatives, and what to expect postoperatively  He understands and is agreeable to go ahead      Plan:  Right inguinal hernia repair

## 2022-05-18 ENCOUNTER — OFFICE VISIT (OUTPATIENT)
Dept: FAMILY MEDICINE CLINIC | Facility: CLINIC | Age: 22
End: 2022-05-18
Payer: COMMERCIAL

## 2022-05-18 VITALS
WEIGHT: 271 LBS | TEMPERATURE: 97.8 F | HEART RATE: 81 BPM | OXYGEN SATURATION: 97 % | DIASTOLIC BLOOD PRESSURE: 70 MMHG | HEIGHT: 78 IN | SYSTOLIC BLOOD PRESSURE: 140 MMHG | BODY MASS INDEX: 31.35 KG/M2

## 2022-05-18 DIAGNOSIS — Z01.818 PRE-OP EVALUATION: ICD-10-CM

## 2022-05-18 DIAGNOSIS — M20.5X1 MALLET TOE OF RIGHT FOOT: Primary | ICD-10-CM

## 2022-05-18 PROCEDURE — 3008F BODY MASS INDEX DOCD: CPT | Performed by: FAMILY MEDICINE

## 2022-05-18 PROCEDURE — 1036F TOBACCO NON-USER: CPT | Performed by: FAMILY MEDICINE

## 2022-05-18 PROCEDURE — 99213 OFFICE O/P EST LOW 20 MIN: CPT | Performed by: FAMILY MEDICINE

## 2022-05-18 NOTE — PROGRESS NOTES
PRE-OPERATIVE EVALUATION  [facility]TH[facility]AS    NAME: Quinton Urbina  AGE: 22 y o  SEX: male  : 2000   DATE: 2022    Pre-Operative Evaluation:     Chief Complaint: Pre-operative Evaluation  Surgery: 2nd and 3rd mallet toe correction, right foot  Anticipated Date of Surgery: 22  Referring Provider: Latonia Gottlieb DPM        History of Present Illness:     Quinton Urbina is a 22 y o  male who presents to the office today for a preoperative consultation at the request of surgeon, Dr India Romberg, who plans on performing mallet toe correction  on 22  Planned anesthesia is local  Patient has a bleeding risk of: no recent abnormal bleeding, no remote history of abnormal bleeding and no use of Ca-channel blockers  Patient does not have objections to receiving blood products if needed  Current anti-platelet/anti-coagulation medications that the patient is prescribed includes: none  Assessment of Chronic Conditions:   - none     Assessment of Cardiac Risk:  Denies unstable or severe angina or MI in the last 6 weeks or history of stent placement in the last year   Denies decompensated heart failure (e g  New onset heart failure, NYHA functional class IV heart failure, or worsening existing heart failure)  Denies significant arrhythmias such as high grade AV block, symptomatic ventricular arrhythmia, newly recognized ventricular tachycardia, supraventricular tachycardia with resting heart rate >100, or symptomatic bradycardia  Denies severe heart valve disease including aortic stenosis or symptomatic mitral stenosis     Exercise Capacity:  Able to walk 4 blocks without symptoms?: No  Able to walk 2 flights without symptoms?: No    Prior Anesthesia Reactions: No     Personal history of venous thromboembolic disease? No    History of steroid use for >2 weeks within last year?  No    STOP-BANG Sleep Apnea Screening Questionnaire:      Do you SNORE loudly (louder than talking or loud enough to be heard through closed doors)? No = 0 point   Do you often feel TIRED, fatigued, or sleepy during daytime? No = 0 point   Has anyone OBSERVED you stop breathing during your sleep? No = 0 point   Do you have or are you being treated for high blood pressure? No = 0 point   BMI more than 35 kg/m2? No = 0 point   AGE over 48years old? No = 0 point   NECK circumference > 16 inches (40 cm)? No = 0 point   Male GENDER? No = 0 point   TOTAL SCORE 0 = LOW risk of VISHAL       Review of Systems:     Review of Systems   Constitutional: Negative for activity change, chills and fever  HENT: Negative for congestion, rhinorrhea and sore throat  Eyes: Negative for visual disturbance  Respiratory: Negative for cough, shortness of breath and wheezing  Cardiovascular: Negative for chest pain and palpitations  Gastrointestinal: Negative for abdominal pain, blood in stool, constipation, diarrhea, nausea and vomiting  Genitourinary: Negative for dysuria  Musculoskeletal: Negative for arthralgias and myalgias  Skin: Negative for rash  Neurological: Negative for dizziness, weakness and headaches  All other systems reviewed and are negative  Problem List:     Patient Active Problem List   Diagnosis    Right inguinal hernia        Allergies: Allergies   Allergen Reactions    Cashew Nut Oil - Food Allergy Anaphylaxis    Nuts - Food Allergy Anaphylaxis     CASHEW/ PISTACHIO    Pistachio Nut Extract Skin Test - Food Allergy Anaphylaxis        Current Medications:       Current Outpatient Medications:     triamcinolone (KENALOG) 0 1 % cream, Apply topically 2 (two) times a day, Disp: 30 g, Rfl: 0    EPINEPHrine (EPIPEN) 0 3 mg/0 3 mL SOAJ, Inject 0 3 mL (0 3 mg total) into a muscle once for 1 dose, Disp: 2 each, Rfl: 1     Past History:     History reviewed  No pertinent past medical history       Past Surgical History:   Procedure Laterality Date    SC OTOPLASTY PROTRUDING EAR W/WO SIZE RDCTJ Bilateral 9/24/2020    Procedure: OTOPLASTY;  Surgeon: Letty Faustin MD;  [facility];  Service: Plastics    MN REMOVAL TESTIS,SIMPLE Left 1/4/2022    Procedure: PARTIAL LEFT ORCHIECTOMY INGUINAL;  Surgeon: Mara Clayton MD;  [facility];  Service: Urology        Family History   Problem Relation Age of Onset    No Known Problems Father     No Known Problems Mother         Social History     Tobacco Use    Smoking status: Never Smoker    Smokeless tobacco: Never Used   Vaping Use    Vaping Use: Never used   Substance Use Topics    Alcohol use: Never    Drug use: Never        Physical Exam:      /70   Pulse 81   Temp 97 8 °F (36 6 °C)   Ht 6' 9" (2 057 m)   Wt 123 kg (271 lb)   SpO2 97%   BMI 29 04 kg/m²     Physical Exam  Vitals and nursing note reviewed  Constitutional:       General: He is not in acute distress  Appearance: He is well-developed  He is not diaphoretic  HENT:      Head: Normocephalic and atraumatic  Right Ear: External ear normal       Left Ear: External ear normal    Eyes:      Conjunctiva/sclera: Conjunctivae normal    Cardiovascular:      Rate and Rhythm: Normal rate and regular rhythm  Pulses: Normal pulses  Heart sounds: Normal heart sounds  No murmur heard  Pulmonary:      Effort: Pulmonary effort is normal  No respiratory distress  Breath sounds: Normal breath sounds  No rhonchi or rales  Skin:     Findings: No rash  Neurological:      Mental Status: He is alert  Cranial Nerves: No cranial nerve deficit  Data:     Pre-operative work-up    Laboratory Results: I have personally reviewed the pertinent laboratory results/reports        Assessment:     1  Mallet toe of right foot     2  Pre-op evaluation          Plan:     22 y o  male with planned surgery: mallet toe correction        Cardiac Risk Estimation: per the Revised Cardiac Risk Index (Circ  100:1043, 1999), the patient's risk factors for cardiac complications include none, putting him in: RCI RISK CLASS I (0 risk factors, risk of major cardiac compl  appr  0 5%)  1  Further preoperative workup as follows:   - None; no further preoperative work-up is required    2  Medication Management/Recommendations:   - None, continue medication regimen including morning of surgery, with sip of water    3  Prophylaxis for cardiac events with perioperative beta-blockers: not indicated  4  Patient requires further consultation with: None    Clearance  Patient is CLEARED for surgery without any additional cardiac testing  A copy of this evaluation was transmitted electronically or by fax to the requesting provider  Thong Martínez MD  [address] [address]  Phone#  [phone]  Fax#  [phone]    Please be aware that this note contains text that was dictated and there may be errors pertaining to "sound-alike" words during the dictation process

## 2022-06-10 ENCOUNTER — ANESTHESIA EVENT (OUTPATIENT)
Dept: PERIOP | Facility: AMBULARY SURGERY CENTER | Age: 22
End: 2022-06-10
Payer: COMMERCIAL

## 2022-06-15 ENCOUNTER — TELEPHONE (OUTPATIENT)
Dept: FAMILY MEDICINE CLINIC | Facility: CLINIC | Age: 22
End: 2022-06-15

## 2022-06-15 NOTE — TELEPHONE ENCOUNTER
Dipti from [address] [place] [address] called asking if patient needs to schedule a pre op clearance again for his foot surgery  He is having a correction of the mallet toe deformity second and third toe on his LEFT foot this time  Eh Fay states that podiatry does not require repeat blood work for surgeries that are local with [place] [address]  His previous surgery was on 5/25/22 for the correct of the mallet toe deformity on his RIGHT foot

## 2022-06-23 ENCOUNTER — ANESTHESIA (OUTPATIENT)
Dept: PERIOP | Facility: AMBULARY SURGERY CENTER | Age: 22
End: 2022-06-23
Payer: COMMERCIAL

## 2022-06-23 ENCOUNTER — HOSPITAL ENCOUNTER (OUTPATIENT)
Facility: AMBULARY SURGERY CENTER | Age: 22
Setting detail: OUTPATIENT SURGERY
Discharge: HOME | End: 2022-06-23
Attending: SURGERY | Admitting: SURGERY
Payer: COMMERCIAL

## 2022-06-23 VITALS
BODY MASS INDEX: 30.55 KG/M2 | DIASTOLIC BLOOD PRESSURE: 78 MMHG | OXYGEN SATURATION: 97 % | WEIGHT: 264 LBS | SYSTOLIC BLOOD PRESSURE: 115 MMHG | HEART RATE: 75 BPM | HEIGHT: 78 IN | RESPIRATION RATE: 18 BRPM | TEMPERATURE: 97.5 F

## 2022-06-23 PROCEDURE — C1781 MESH (IMPLANTABLE): HCPCS | Performed by: SURGERY

## 2022-06-23 PROCEDURE — NC001 PR NO CHARGE: Performed by: SURGERY

## 2022-06-23 PROCEDURE — 49505 PRP I/HERN INIT REDUC >5 YR: CPT | Performed by: SURGERY

## 2022-06-23 DEVICE — BARD MESH
Type: IMPLANTABLE DEVICE | Site: INGUINAL | Status: FUNCTIONAL
Brand: BARD MESH

## 2022-06-23 RX ORDER — LIDOCAINE HYDROCHLORIDE 20 MG/ML
INJECTION, SOLUTION EPIDURAL; INFILTRATION; INTRACAUDAL; PERINEURAL AS NEEDED
Status: DISCONTINUED | OUTPATIENT
Start: 2022-06-23 | End: 2022-06-23

## 2022-06-23 RX ORDER — MAGNESIUM HYDROXIDE 1200 MG/15ML
LIQUID ORAL AS NEEDED
Status: DISCONTINUED | OUTPATIENT
Start: 2022-06-23 | End: 2022-06-23 | Stop reason: HOSPADM

## 2022-06-23 RX ORDER — ONDANSETRON 2 MG/ML
4 INJECTION INTRAMUSCULAR; INTRAVENOUS ONCE AS NEEDED
Status: DISCONTINUED | OUTPATIENT
Start: 2022-06-23 | End: 2022-06-23 | Stop reason: HOSPADM

## 2022-06-23 RX ORDER — FENTANYL CITRATE 50 UG/ML
INJECTION, SOLUTION INTRAMUSCULAR; INTRAVENOUS AS NEEDED
Status: DISCONTINUED | OUTPATIENT
Start: 2022-06-23 | End: 2022-06-23

## 2022-06-23 RX ORDER — KETOROLAC TROMETHAMINE 30 MG/ML
INJECTION, SOLUTION INTRAMUSCULAR; INTRAVENOUS AS NEEDED
Status: DISCONTINUED | OUTPATIENT
Start: 2022-06-23 | End: 2022-06-23

## 2022-06-23 RX ORDER — CEFAZOLIN SODIUM 1 G/50ML
1000 SOLUTION INTRAVENOUS ONCE
Status: COMPLETED | OUTPATIENT
Start: 2022-06-23 | End: 2022-06-23

## 2022-06-23 RX ORDER — MIDAZOLAM HYDROCHLORIDE 2 MG/2ML
INJECTION, SOLUTION INTRAMUSCULAR; INTRAVENOUS AS NEEDED
Status: DISCONTINUED | OUTPATIENT
Start: 2022-06-23 | End: 2022-06-23

## 2022-06-23 RX ORDER — ONDANSETRON 2 MG/ML
4 INJECTION INTRAMUSCULAR; INTRAVENOUS ONCE
Status: DISCONTINUED | OUTPATIENT
Start: 2022-06-23 | End: 2022-06-23 | Stop reason: HOSPADM

## 2022-06-23 RX ORDER — CEFAZOLIN SODIUM 2 G/50ML
2000 SOLUTION INTRAVENOUS ONCE
Status: DISCONTINUED | OUTPATIENT
Start: 2022-06-23 | End: 2022-06-23 | Stop reason: HOSPADM

## 2022-06-23 RX ORDER — HYDROMORPHONE HCL/PF 1 MG/ML
0.5 SYRINGE (ML) INJECTION EVERY 4 HOURS PRN
Status: DISCONTINUED | OUTPATIENT
Start: 2022-06-23 | End: 2022-06-23 | Stop reason: HOSPADM

## 2022-06-23 RX ORDER — BUPIVACAINE HYDROCHLORIDE AND EPINEPHRINE 5; 5 MG/ML; UG/ML
INJECTION, SOLUTION PERINEURAL AS NEEDED
Status: DISCONTINUED | OUTPATIENT
Start: 2022-06-23 | End: 2022-06-23 | Stop reason: HOSPADM

## 2022-06-23 RX ORDER — SODIUM CHLORIDE, SODIUM LACTATE, POTASSIUM CHLORIDE, CALCIUM CHLORIDE 600; 310; 30; 20 MG/100ML; MG/100ML; MG/100ML; MG/100ML
INJECTION, SOLUTION INTRAVENOUS CONTINUOUS PRN
Status: DISCONTINUED | OUTPATIENT
Start: 2022-06-23 | End: 2022-06-23

## 2022-06-23 RX ORDER — SODIUM CHLORIDE, SODIUM LACTATE, POTASSIUM CHLORIDE, CALCIUM CHLORIDE 600; 310; 30; 20 MG/100ML; MG/100ML; MG/100ML; MG/100ML
125 INJECTION, SOLUTION INTRAVENOUS CONTINUOUS
Status: DISCONTINUED | OUTPATIENT
Start: 2022-06-23 | End: 2022-06-23 | Stop reason: HOSPADM

## 2022-06-23 RX ORDER — DEXAMETHASONE SODIUM PHOSPHATE 10 MG/ML
INJECTION, SOLUTION INTRAMUSCULAR; INTRAVENOUS AS NEEDED
Status: DISCONTINUED | OUTPATIENT
Start: 2022-06-23 | End: 2022-06-23

## 2022-06-23 RX ORDER — ONDANSETRON 2 MG/ML
INJECTION INTRAMUSCULAR; INTRAVENOUS AS NEEDED
Status: DISCONTINUED | OUTPATIENT
Start: 2022-06-23 | End: 2022-06-23

## 2022-06-23 RX ORDER — FENTANYL CITRATE/PF 50 MCG/ML
50 SYRINGE (ML) INJECTION
Status: DISCONTINUED | OUTPATIENT
Start: 2022-06-23 | End: 2022-06-23 | Stop reason: HOSPADM

## 2022-06-23 RX ORDER — HYDROCODONE BITARTRATE AND ACETAMINOPHEN 5; 325 MG/1; MG/1
1 TABLET ORAL EVERY 4 HOURS PRN
Status: DISCONTINUED | OUTPATIENT
Start: 2022-06-23 | End: 2022-06-23 | Stop reason: HOSPADM

## 2022-06-23 RX ORDER — SODIUM CHLORIDE, SODIUM LACTATE, POTASSIUM CHLORIDE, CALCIUM CHLORIDE 600; 310; 30; 20 MG/100ML; MG/100ML; MG/100ML; MG/100ML
20 INJECTION, SOLUTION INTRAVENOUS CONTINUOUS
Status: DISCONTINUED | OUTPATIENT
Start: 2022-06-23 | End: 2022-06-23 | Stop reason: HOSPADM

## 2022-06-23 RX ORDER — PROPOFOL 10 MG/ML
INJECTION, EMULSION INTRAVENOUS AS NEEDED
Status: DISCONTINUED | OUTPATIENT
Start: 2022-06-23 | End: 2022-06-23

## 2022-06-23 RX ADMIN — DEXAMETHASONE SODIUM PHOSPHATE 10 MG: 10 INJECTION, SOLUTION INTRAMUSCULAR; INTRAVENOUS at 11:25

## 2022-06-23 RX ADMIN — CEFAZOLIN SODIUM 3000 MG: 1 SOLUTION INTRAVENOUS at 11:33

## 2022-06-23 RX ADMIN — SODIUM CHLORIDE, SODIUM LACTATE, POTASSIUM CHLORIDE, AND CALCIUM CHLORIDE: .6; .31; .03; .02 INJECTION, SOLUTION INTRAVENOUS at 12:13

## 2022-06-23 RX ADMIN — KETOROLAC TROMETHAMINE 30 MG: 30 INJECTION, SOLUTION INTRAMUSCULAR at 12:06

## 2022-06-23 RX ADMIN — LIDOCAINE HYDROCHLORIDE 100 MG: 20 INJECTION, SOLUTION EPIDURAL; INFILTRATION; INTRACAUDAL; PERINEURAL at 11:22

## 2022-06-23 RX ADMIN — SODIUM CHLORIDE, SODIUM LACTATE, POTASSIUM CHLORIDE, AND CALCIUM CHLORIDE: .6; .31; .03; .02 INJECTION, SOLUTION INTRAVENOUS at 11:19

## 2022-06-23 RX ADMIN — ONDANSETRON 4 MG: 2 INJECTION INTRAMUSCULAR; INTRAVENOUS at 11:25

## 2022-06-23 RX ADMIN — MIDAZOLAM 2 MG: 1 INJECTION INTRAMUSCULAR; INTRAVENOUS at 11:16

## 2022-06-23 RX ADMIN — FENTANYL CITRATE 25 MCG: 50 INJECTION INTRAMUSCULAR; INTRAVENOUS at 11:22

## 2022-06-23 RX ADMIN — PROPOFOL 400 MG: 10 INJECTION, EMULSION INTRAVENOUS at 11:22

## 2022-06-23 RX ADMIN — FENTANYL CITRATE 25 MCG: 50 INJECTION INTRAMUSCULAR; INTRAVENOUS at 11:40

## 2022-06-23 RX ADMIN — FENTANYL CITRATE 25 MCG: 50 INJECTION INTRAMUSCULAR; INTRAVENOUS at 12:13

## 2022-06-23 RX ADMIN — FENTANYL CITRATE 25 MCG: 50 INJECTION INTRAMUSCULAR; INTRAVENOUS at 11:26

## 2022-06-23 NOTE — ANESTHESIA POSTPROCEDURE EVALUATION
Post-Op Assessment Note    CV Status:  Stable  Pain Score: 0    Pain management: adequate     Mental Status:  Arousable and sleepy   Hydration Status:  Stable   PONV Controlled:  Controlled   Airway Patency:  Patent      Post Op Vitals Reviewed: Yes      Staff: CRNA         No complications documented      BP      Temp     Pulse     Resp      SpO2

## 2022-06-23 NOTE — ANESTHESIA PREPROCEDURE EVALUATION
Procedure:  REPAIR HERNIA INGUINAL (Right Groin)    Relevant Problems   Other   (+) Right inguinal hernia        Physical Exam    Airway    Mallampati score: III  TM Distance: >3 FB  Neck ROM: full     Dental       Cardiovascular      Pulmonary      Other Findings        Anesthesia Plan  ASA Score- 1     Anesthesia Type- general with ASA Monitors  Additional Monitors:   Airway Plan: LMA  Plan Factors-    Chart reviewed  Existing labs reviewed  Patient summary reviewed  Induction- intravenous  Postoperative Plan- Plan for postoperative opioid use  Informed Consent- Anesthetic plan and risks discussed with patient  I personally reviewed this patient with the CRNA  Discussed and agreed on the Anesthesia Plan with the CRNA  Ryan Orellana

## 2022-06-23 NOTE — H&P
Assessment and Plan       Problem List Items Addressed This Visit                 Other      Right inguinal hernia - Primary        I discussed with he and his father repairing his right inguinal hernia  I discussed the procedure in detail including risks, benefits, alternatives, and what to expect postoperatively  He understands and is agreeable to go ahead      Plan:  Right inguinal hernia repair                    Chief Complaint:  Spenser Arguelles is a 22 y o  male who presents for Hernia (recheck right inguinal hernia )     Subjective  [de-identified] year old male with known right inguinal hernia back for scheduling  No new complaints since last visit  Notices bulging in the right groin region  No change in bowel or bladder habits      Medical History   History reviewed   No pertinent past medical history         Surgical History         Past Surgical History:   Procedure Laterality Date    NJ OTOPLASTY PROTRUDING EAR W/WO SIZE RDCTJ Bilateral 9/24/2020     Procedure: OTOPLASTY;  Surgeon: Eneida Hogue MD;  [facility];  Service: Plastics    NJ REMOVAL TESTIS,SIMPLE Left 1/4/2022     Procedure: PARTIAL LEFT ORCHIECTOMY INGUINAL;  Surgeon: Isha Holt MD;  [facility];  Service: Urology                  Family History   Problem Relation Age of Onset    No Known Problems Father      No Known Problems Mother           Social History           Tobacco Use    Smoking status: Never Smoker    Smokeless tobacco: Never Used   Vaping Use    Vaping Use: Never used   Substance Use Topics    Alcohol use: Never    Drug use: Never         Medications         Current Outpatient Medications on File Prior to Visit   Medication Sig Dispense Refill    EPINEPHrine (EPIPEN) 0 3 mg/0 3 mL SOAJ Inject 0 3 mL (0 3 mg total) into a muscle once for 1 dose 2 each 1    triamcinolone (KENALOG) 0 1 % cream Apply topically 2 (two) times a day (Patient not taking: Reported on 12/22/2021 ) 30 g 0      No current facility-administered medications on file prior to visit          Allergies        Allergies   Allergen Reactions    Cashew Nut Oil - Food Allergy Anaphylaxis    Nuts - Food Allergy Anaphylaxis       CASHEW/ PISTACHIO    Pistachio Nut Extract Skin Test - Food Allergy Anaphylaxis         Review of Systems   Constitutional: Negative for chills, fatigue and fever  HENT: Negative for congestion, ear pain, sneezing, trouble swallowing and voice change  Eyes: Negative for pain and discharge  Respiratory: Negative for cough, shortness of breath and wheezing  Cardiovascular: Negative for palpitations and leg swelling  Gastrointestinal: Negative for abdominal pain, constipation, diarrhea, nausea and vomiting  Endocrine: Negative for cold intolerance, heat intolerance and polyuria  Genitourinary: Negative for decreased urine volume, difficulty urinating and dysuria  Musculoskeletal: Negative for arthralgias and back pain  Skin: Negative for rash and wound  Allergic/Immunologic: Negative for environmental allergies and food allergies  Neurological: Negative for dizziness and weakness  Hematological: Negative for adenopathy  Does not bruise/bleed easily  Psychiatric/Behavioral: Negative for confusion and sleep disturbance  The patient is not nervous/anxious  All other systems reviewed and are negative         Objective      Vitals:     05/10/22 1002   Temp: (!) 97 4 °F (36 3 °C)         Physical Exam  Vitals and nursing note reviewed  Constitutional:       General: He is not in acute distress  Appearance: He is well-developed  He is not diaphoretic  HENT:      Head: Normocephalic and atraumatic  Right Ear: External ear normal       Left Ear: External ear normal    Eyes:      General: No scleral icterus  Conjunctiva/sclera: Conjunctivae normal    Neck:      Thyroid: No thyromegaly  Trachea: No tracheal deviation     Cardiovascular:      Rate and Rhythm: Normal rate and regular rhythm  Heart sounds: Normal heart sounds  No murmur heard        Pulmonary:      Effort: Pulmonary effort is normal  No respiratory distress  Breath sounds: Normal breath sounds  No wheezing or rales  Abdominal:      General: There is no distension  Palpations: Abdomen is soft  There is no mass  Tenderness: There is no abdominal tenderness  There is no guarding or rebound  Comments: Left groin incision well healed  Right inguinal hernia present and reducible   Musculoskeletal:         General: Normal range of motion  Cervical back: Normal range of motion and neck supple  Lymphadenopathy:      Cervical: No cervical adenopathy  Skin:     General: Skin is warm and dry  Neurological:      Mental Status: He is alert and oriented to person, place, and time  Psychiatric:         Behavior: Behavior normal          Thought Content:  Thought content normal          Judgment: Judgment normal          /63   Pulse 61   Temp (!) 96 9 °F (36 1 °C) (Temporal)   Resp 18   Ht 6' 9" (2 057 m)   Wt 120 kg (264 lb)   SpO2 99%   BMI 28 29 kg/m²

## 2022-06-23 NOTE — OP NOTE
OPERATIVE REPORT  PATIENT NAME: Drake Garcia    :  2000  MRN: [id number]  Pt [facility]    SURGERY DATE: 2022    Surgeon(s) and Role:     * Wanda Barber MD - Primary     * Anne Caba MD - Assisting    Preop Diagnosis:  Right inguinal hernia [K40 90]    Post-Op Diagnosis Codes:     * Right inguinal hernia [K40 90]    Procedure(s) (LRB):  REPAIR HERNIA INGUINAL (Right)    Specimen(s):  * No specimens in log *    Estimated Blood Loss:   Minimal    Drains:  * No LDAs found *    Anesthesia Type:   General    Operative Indications:  Right inguinal hernia [K40 90]    Operative Findings:  Indirect R inguinal hernia  High ligation of large indirect hernia sac    Complications:   None    Procedure and Technique:  Patient was identified and marked in the preoperative holding area taken to the operating room and again identified verbally and via wrist band in the operating room  Patient was laid supine on the operating table, both arms were extended, pressure points were padded, patient was induced with general anesthesia, airway was secured with LMA intubation per anesthesia colleagues  Abdomen was prepped and draped in regular sterile fashion, time-out was called all were in agreement  Attention was drawn towards the landmarks of the right groin including the anterior superior iliac spine and lateral border of the pubic tubercle  Inguinal nerve block was performed with generous amount of local anesthesia  6 cm skin incision was made with 15 blade scalpel along the inguinal ligament, subcutaneous tissues were dissected with Bovie electrocautery down through Filemon's fascia, identifying the superficial branch of the inferior epigastric vein, which was cauterized with Bovie electrocautery  Dissection was continued to the level of external oblique aponeurosis which was skeletonized free of surrounding tissues    Stab incision was made in the external oblique aponeurosis, grasped anteriorly with hemostats, and excised and opened to the external ring proximally and inferiorly to the external ring using Metzenbaum scissors  Peanut forceps was used to free up surrounding tissues from the posterior aspect of the external oblique  Cord structures were bluntly dissected and secured with Penrose drain  Within the cord structures we identified the spermatic vessels, and vas deferens, both of which were preserved  Cremaster muscles were dissected off of cord structures, we identified an indirect hernia sac which was large and extending into the scrotum  At this point we were unable to fully reduce the hernia sac, we ensured that that did not contain any bowel, and entered the hernia sac  We then performed high ligation of the hernia sac  Ligated hernia sac was then reduced into the abdomen  We then proceeded to place a 1 in x 4 in Bard polypropylene mesh  Mesh was secured to the pubic tubercle with 2-0 Prolene double-arm suture  The lateral edge of the mesh was secured to the shelving edge of the inguinal ligament in a running fashion, and medial edge of the mesh was secured to the conjoint tendon in a running fashion  We cut the superior end of the mesh and wrapped both arms to reapproximate and create the new internal ring  Both arms of the mesh were overlapped and sutured together  Finally external oblique was closed with 2-0 Vicryl running suture  Ramon Ybarra was closed with 2-0 Vicryl simple interrupted  Skin was closed with 4 Monocryl in a running fashion  Exofin skin glue was applied  At the end of the case sponge instrument counts were performed all were correct  Patient was awoken from anesthesia, extubated, taken to PACU in stable condition  Dr Alex Vang was present for the type procedure     I was present for the entire procedure    Patient Disposition:  PACU       SIGNATURE: Fernie Flores MD  DATE: June 23, 2022  TIME: 12:23 PM

## 2022-06-27 ENCOUNTER — OFFICE VISIT (OUTPATIENT)
Dept: FAMILY MEDICINE CLINIC | Facility: CLINIC | Age: 22
End: 2022-06-27
Payer: COMMERCIAL

## 2022-06-27 VITALS
OXYGEN SATURATION: 98 % | HEIGHT: 78 IN | TEMPERATURE: 98.4 F | WEIGHT: 263 LBS | BODY MASS INDEX: 30.43 KG/M2 | DIASTOLIC BLOOD PRESSURE: 82 MMHG | HEART RATE: 78 BPM | SYSTOLIC BLOOD PRESSURE: 112 MMHG

## 2022-06-27 DIAGNOSIS — Z01.818 PRE-OP EVALUATION: ICD-10-CM

## 2022-06-27 DIAGNOSIS — M20.5X2 MALLET TOE OF LEFT FOOT: Primary | ICD-10-CM

## 2022-06-27 PROCEDURE — 1036F TOBACCO NON-USER: CPT | Performed by: FAMILY MEDICINE

## 2022-06-27 PROCEDURE — 99213 OFFICE O/P EST LOW 20 MIN: CPT | Performed by: FAMILY MEDICINE

## 2022-06-27 PROCEDURE — 3008F BODY MASS INDEX DOCD: CPT | Performed by: FAMILY MEDICINE

## 2022-06-27 RX ORDER — ACETAMINOPHEN AND CODEINE PHOSPHATE 300; 30 MG/1; MG/1
TABLET ORAL
COMMUNITY
Start: 2022-05-25

## 2022-06-27 NOTE — PROGRESS NOTES
PRE-OPERATIVE EVALUATION  [facility] MATTHIAS    NAME: Amelia Spears  AGE: 22 y o  SEX: male  : 2000   DATE: 2022    Pre-Operative Evaluation:     Chief Complaint: Pre-operative Evaluation  Surgery: correction 2-3rd mallet toes, left foot   Anticipated Date of Surgery: 22  Referring Provider: Dr Shari Graff        History of Present Illness:     Akshat Chi is a 22 y o  male who presents to the office today for a preoperative consultation at the request of surgeon, Dr Shari Graff, who plans on performing correction 2-3rd mallet toes of left foot on 22  Planned anesthesia is regional  Patient has a bleeding risk of: no recent abnormal bleeding, no remote history of abnormal bleeding and no use of Ca-channel blockers  Patient does not have objections to receiving blood products if needed  Current anti-platelet/anti-coagulation medications that the patient is prescribed includes: none  Assessment of Chronic Conditions:   - none     Assessment of Cardiac Risk:  Denies unstable or severe angina or MI in the last 6 weeks or history of stent placement in the last year   Denies decompensated heart failure (e g  New onset heart failure, NYHA functional class IV heart failure, or worsening existing heart failure)  Denies significant arrhythmias such as high grade AV block, symptomatic ventricular arrhythmia, newly recognized ventricular tachycardia, supraventricular tachycardia with resting heart rate >100, or symptomatic bradycardia  Denies severe heart valve disease including aortic stenosis or symptomatic mitral stenosis     Exercise Capacity:  Able to walk 4 blocks without symptoms?: Yes  Able to walk 2 flights without symptoms?: Yes    Prior Anesthesia Reactions: No     Personal history of venous thromboembolic disease? No    History of steroid use for >2 weeks within last year?  No    STOP-BANG Sleep Apnea Screening Questionnaire:      Do you SNORE loudly (louder than talking or loud enough to be heard through closed doors)? No = 0 point   Do you often feel TIRED, fatigued, or sleepy during daytime? No = 0 point   Has anyone OBSERVED you stop breathing during your sleep? No = 0 point   Do you have or are you being treated for high blood pressure? No = 0 point   BMI more than 35 kg/m2? No = 0 point   AGE over 48years old? No = 0 point   NECK circumference > 16 inches (40 cm)? No = 0 point   Male GENDER? Yes = 1 point   TOTAL SCORE 0 = LOW risk of VISHAL       Review of Systems:     Review of Systems   Constitutional: Negative for activity change, chills and fever  HENT: Negative for congestion, rhinorrhea and sore throat  Eyes: Negative for visual disturbance  Respiratory: Negative for cough, shortness of breath and wheezing  Cardiovascular: Negative for chest pain and palpitations  Gastrointestinal: Negative for abdominal pain, blood in stool, constipation, diarrhea, nausea and vomiting  Genitourinary: Negative for dysuria  Musculoskeletal: Positive for arthralgias  Negative for myalgias  Skin: Negative for rash  Neurological: Negative for dizziness, weakness and headaches  All other systems reviewed and are negative  Problem List:     Patient Active Problem List   Diagnosis    Right inguinal hernia        Allergies: Allergies   Allergen Reactions    Cashew Nut Oil - Food Allergy Anaphylaxis    Nuts - Food Allergy Anaphylaxis     CASHEW/ PISTACHIO    Pistachio Nut Extract Skin Test - Food Allergy Anaphylaxis        Current Medications:       Current Outpatient Medications:     EPINEPHrine (EPIPEN) 0 3 mg/0 3 mL SOAJ, Inject 0 3 mL (0 3 mg total) into a muscle once for 1 dose, Disp: 2 each, Rfl: 1    acetaminophen-codeine (TYLENOL with CODEINE #3) 300-30 MG per tablet, TAKE 1 TABLET BY MOUTH EVERY 6 HOURS AS NEEDED FOR MODERATE PAIN (PAIN SCORE 4-6)   (Patient not taking: Reported on 6/27/2022), Disp: , Rfl:      Past History: History reviewed  No pertinent past medical history  Past Surgical History:   Procedure Laterality Date    UT OTOPLASTY PROTRUDING EAR W/WO SIZE RDCTJ Bilateral 9/24/2020    Procedure: OTOPLASTY;  Surgeon: Jimmie Sherwood MD;  [facility];  Service: Plastics    UT REMOVAL TESTIS,SIMPLE Left 1/4/2022    Procedure: PARTIAL LEFT ORCHIECTOMY INGUINAL;  Surgeon: Silvia Mora MD;  [facility];  Service: Urology    UT REPAIR [address] HERNIA,5+Y/O,REDUCIBL Right 6/23/2022    Procedure: REPAIR HERNIA INGUINAL;  Surgeon: William Henderson MD;  [facility];  Service: General        Family History   Problem Relation Age of Onset    No Known Problems Mother     Cancer Father         Social History     Tobacco Use    Smoking status: Never Smoker    Smokeless tobacco: Never Used   Vaping Use    Vaping Use: Never used   Substance Use Topics    Alcohol use: Never    Drug use: Never        Physical Exam:      /82   Pulse 78   Temp 98 4 °F (36 9 °C)   Ht 6' 9" (2 057 m)   Wt 119 kg (263 lb)   SpO2 98%   BMI 28 18 kg/m²     Physical Exam  Vitals and nursing note reviewed  Constitutional:       General: He is not in acute distress  Appearance: Normal appearance  He is well-developed and normal weight  He is not diaphoretic  HENT:      Head: Normocephalic and atraumatic  Right Ear: External ear normal       Left Ear: External ear normal    Eyes:      Conjunctiva/sclera: Conjunctivae normal    Cardiovascular:      Rate and Rhythm: Normal rate and regular rhythm  Pulses: Normal pulses  Dorsalis pedis pulses are 2+ on the left side  Posterior tibial pulses are 2+ on the left side  Heart sounds: Normal heart sounds  No murmur heard  Pulmonary:      Effort: Pulmonary effort is normal  No respiratory distress  Breath sounds: Normal breath sounds  No wheezing, rhonchi or rales  Skin:     Findings: No rash     Neurological:      Mental Status: He is alert  Cranial Nerves: No cranial nerve deficit  Data:     Pre-operative work-up    Laboratory Results: I have personally reviewed the pertinent laboratory results/reports        Assessment:     1  Mallet toe of left foot     2  Pre-op evaluation          Plan:     22 y o  male with planned surgery: 2-3rd mallet toe correction, LEFT foot  Cardiac Risk Estimation: per the Revised Cardiac Risk Index (Circ  [id number], 1999), the patient's risk factors for cardiac complications include none, putting him in: RCI RISK CLASS I (0 risk factors, risk of major cardiac compl  appr  0 5%)  1  Further preoperative workup as follows:   - None; no further preoperative work-up is required    2  Medication Management/Recommendations:   - Not applicable, not on any medications  - Patient has been instructed to avoid herbs or non-directed vitamins the week prior to surgery to ensure no drug interactions with perioperative surgical and anesthetic medications  - Patient has been instructed to avoid aspirin containing medications or non-steroidal anti-inflammatory drugs for the week preceding surgery  3  Prophylaxis for cardiac events with perioperative beta-blockers: not indicated  4  Patient requires further consultation with: None    Clearance  Patient is CLEARED for surgery without any additional cardiac testing  A copy of this evaluation was transmitted electronically or by fax to the requesting provider  Roxanne Saravia MD  [address] [address]  Phone#  [phone]  Fax#  [phone]    Please be aware that this note contains text that was dictated and there may be errors pertaining to "sound-alike" words during the dictation process

## 2022-07-11 ENCOUNTER — HOSPITAL ENCOUNTER (OUTPATIENT)
Dept: ULTRASOUND IMAGING | Facility: HOSPITAL | Age: 22
Discharge: HOME | End: 2022-07-11
Payer: COMMERCIAL

## 2022-07-11 DIAGNOSIS — N44.2 TESTICULAR CYST: ICD-10-CM

## 2022-07-11 PROCEDURE — 76870 US EXAM SCROTUM: CPT

## 2022-07-12 ENCOUNTER — OFFICE VISIT (OUTPATIENT)
Dept: SURGERY | Facility: CLINIC | Age: 22
End: 2022-07-12

## 2022-07-12 VITALS
TEMPERATURE: 97.2 F | DIASTOLIC BLOOD PRESSURE: 78 MMHG | SYSTOLIC BLOOD PRESSURE: 127 MMHG | WEIGHT: 262 LBS | HEIGHT: 78 IN | BODY MASS INDEX: 30.31 KG/M2 | HEART RATE: 93 BPM

## 2022-07-12 DIAGNOSIS — Z98.890 STATUS POST RIGHT INGUINAL HERNIA REPAIR: Primary | ICD-10-CM

## 2022-07-12 DIAGNOSIS — Z87.19 STATUS POST RIGHT INGUINAL HERNIA REPAIR: Primary | ICD-10-CM

## 2022-07-12 PROBLEM — K40.90 RIGHT INGUINAL HERNIA: Status: RESOLVED | Noted: 2021-12-22 | Resolved: 2022-07-12

## 2022-07-12 PROCEDURE — 99024 POSTOP FOLLOW-UP VISIT: CPT | Performed by: SURGERY

## 2022-07-12 NOTE — ASSESSMENT & PLAN NOTE
Doing well from a surgical standpoint    Asked him the take it easy the next couple of weeks then can get back to lifting as normal   See him back here if needed

## 2022-07-12 NOTE — PROGRESS NOTES
Office Visit - General Surgery  Eryn Akins MRN: [id number]  Encounter: [id number]    Assessment and Plan  Problem List Items Addressed This Visit        Other    Status post right inguinal hernia repair - Primary     Doing well from a surgical standpoint  Asked him the take it easy the next couple of weeks then can get back to lifting as normal   See him back here if needed                 Chief Complaint:  Eryn Akins is a 22 y o  male who presents for Post-op (P/o right inguinal hernia repair)    Subjective  22year old male status post right inguinal hernia repair doing well with no real complaints or problems  History reviewed  No pertinent past medical history  Past Surgical History:   Procedure Laterality Date    MA OTOPLASTY PROTRUDING EAR W/WO SIZE RDCTJ Bilateral 9/24/2020    Procedure: OTOPLASTY;  Surgeon: Mirian Maynard MD;  [facility];  Service: Plastics    MA REMOVAL TESTIS,SIMPLE Left 1/4/2022    Procedure: PARTIAL LEFT ORCHIECTOMY INGUINAL;  Surgeon: Yinka Smith MD;  [facility];  Service: Urology    MA REPAIR [address] HERNIA,5+Y/O,REDUCIBL Right 6/23/2022    Procedure: REPAIR HERNIA INGUINAL;  Surgeon: Antoinette Gamez MD;  [facility];  Service: General       Family History   Problem Relation Age of Onset    No Known Problems Mother     Cancer Father        Social History     Tobacco Use    Smoking status: Never Smoker    Smokeless tobacco: Never Used   Vaping Use    Vaping Use: Never used   Substance Use Topics    Alcohol use: Never    Drug use: Never        Medications  Current Outpatient Medications on File Prior to Visit   Medication Sig Dispense Refill    acetaminophen-codeine (TYLENOL with CODEINE #3) 300-30 MG per tablet TAKE 1 TABLET BY MOUTH EVERY 6 HOURS AS NEEDED FOR MODERATE PAIN (PAIN SCORE 4-6)   (Patient not taking: No sig reported)      EPINEPHrine (EPIPEN) 0 3 mg/0 3 mL SOAJ Inject 0 3 mL (0 3 mg total) into a muscle once for 1 dose 2 each 1     No current facility-administered medications on file prior to visit         Allergies  Allergies   Allergen Reactions    Cashew Nut Oil - Food Allergy Anaphylaxis    Nuts - Food Allergy Anaphylaxis     CASHEW/ PISTACHIO    Pistachio Nut Extract Skin Test - Food Allergy Anaphylaxis       Review of Systems    Objective  Vitals:    07/12/22 1118   BP: 127/78   Pulse: 93   Temp: (!) 97 2 °F (36 2 °C)       Physical Exam   Abdomen:  Right groin incision healing well, minimal to no swelling, no erythema, no tenderness

## 2022-07-20 ENCOUNTER — OFFICE VISIT (OUTPATIENT)
Dept: UROLOGY | Facility: CLINIC | Age: 22
End: 2022-07-20
Payer: COMMERCIAL

## 2022-07-20 VITALS
DIASTOLIC BLOOD PRESSURE: 80 MMHG | WEIGHT: 263 LBS | HEART RATE: 84 BPM | SYSTOLIC BLOOD PRESSURE: 140 MMHG | OXYGEN SATURATION: 97 % | HEIGHT: 78 IN | BODY MASS INDEX: 30.43 KG/M2

## 2022-07-20 DIAGNOSIS — N44.2 TESTICULAR CYST: Primary | ICD-10-CM

## 2022-07-20 PROCEDURE — 99213 OFFICE O/P EST LOW 20 MIN: CPT | Performed by: PHYSICIAN ASSISTANT

## 2022-07-20 NOTE — PROGRESS NOTES
UROLOGY PROGRESS NOTE   Patient Identifiers: Kai Garcia (MRN [id number])  Date of Service: 7/20/2022    Subjective:    15-year-old male with history of left testicular mass  He had a partial left orchiectomy January 4th  Pathology reviewed an epidermoid cyst   Follow-up ulnar ultrasound shows no evidence of recurrent disease and normal left testicle  He reports no new problems and thorough healing  Patient accompanied by his father  Reason for visit:  Testicular epidermoid cyst follow-up      Objective:     VITALS:    Vitals:    07/20/22 1336   BP: 140/80   Pulse: 84   SpO2: 97%           LABS:  No results found for: HGB, HCT, WBC, PLT]    No results found for: NA, K, CL, CO2, BUN, CREATININE, CALCIUM, GLUCOSE]        INPATIENT MEDS:    Current Outpatient Medications:     acetaminophen-codeine (TYLENOL with CODEINE #3) 300-30 MG per tablet, TAKE 1 TABLET BY MOUTH EVERY 6 HOURS AS NEEDED FOR MODERATE PAIN (PAIN SCORE 4-6)  (Patient not taking: No sig reported), Disp: , Rfl:     EPINEPHrine (EPIPEN) 0 3 mg/0 3 mL SOAJ, Inject 0 3 mL (0 3 mg total) into a muscle once for 1 dose, Disp: 2 each, Rfl: 1      Physical Exam:   /80 (BP Location: Left arm, Patient Position: Sitting, Cuff Size: Standard)   Pulse 84   Ht 6' 9" (2 057 m)   Wt 119 kg (263 lb)   SpO2 97%   BMI 28 18 kg/m²   GEN: no acute distress    RESP: breathing comfortably with no accessory muscle use    ABD: soft, non-tender, non-distended   INCISION:    EXT: no significant peripheral edema   (Male): Penis circumcised, phallus normal, meatus patent  Testicles descended into scrotum bilaterally without masses nor tenderness  Scrotum is well healed with no testicular abnormality  No inguinal hernias bilaterally  RADIOLOGY:   SCROTAL ULTRASOUND   IMPRESSION:     Previously noted testicular mass has been removed  This exam is unremarkable without evidence of intratesticular mass  Assessment:   1   Left testicular epidermoid cyst     Plan:   -follow-up in 1 year with ultrasound prior to visit  -  -  -

## 2024-05-17 ENCOUNTER — OFFICE VISIT (OUTPATIENT)
Dept: FAMILY MEDICINE CLINIC | Facility: CLINIC | Age: 24
End: 2024-05-17

## 2024-05-17 VITALS
BODY MASS INDEX: 31.12 KG/M2 | TEMPERATURE: 97.2 F | HEIGHT: 78 IN | SYSTOLIC BLOOD PRESSURE: 122 MMHG | OXYGEN SATURATION: 98 % | HEART RATE: 78 BPM | WEIGHT: 269 LBS | DIASTOLIC BLOOD PRESSURE: 82 MMHG

## 2024-05-17 DIAGNOSIS — L82.1 SEBORRHEIC KERATOSIS: Primary | ICD-10-CM

## 2024-05-17 NOTE — PROGRESS NOTES
"Ambulatory Visit  Name: Mulugeta Spears      : 2000      MRN: [id number]  Encounter Provider: Leidy Qureshi MD  Encounter Date: 2024   Encounter department: [facility]    Assessment & Plan   1. Seborrheic keratosis  Reassured benign. Follow up with any new lesions of concern.        History of Present Illness     HPI  Patient presents with a mole on his lower back. It's not bothersome, itchy or painful. It's been there a while. His mother was concerned that it has changed in appearance and protrudes a bit more now. No FHX melanoma.    Review of Systems   Constitutional:  Negative for fever.   HENT:  Negative for congestion.    Respiratory:  Negative for cough.    Skin:         mole   All other systems reviewed and are negative.    Medical History Reviewed by provider this encounter:  Tobacco  Allergies  Meds  Problems  Med Hx  Surg Hx  Fam Hx       Objective     /82 (BP Location: Left arm, Patient Position: Sitting, Cuff Size: Large)   Pulse 78   Temp (!) 97.2 °F (36.2 °C)   Ht 6' 9\" (2.057 m)   Wt 122 kg (269 lb)   SpO2 98%   BMI 28.83 kg/m²     Physical Exam  Constitutional:       General: He is not in acute distress.     Appearance: Normal appearance. He is normal weight. He is not toxic-appearing or diaphoretic.   HENT:      Head: Normocephalic and atraumatic.      Nose: Nose normal.   Pulmonary:      Effort: Pulmonary effort is normal. No respiratory distress.   Skin:     Findings: No rash.      Comments: Sub-centimeter lesion to the lower left back   Neurological:      General: No focal deficit present.      Mental Status: He is alert. Mental status is at baseline.           "

## (undated) DEVICE — SUT MONOCRYL 4-0 PS-2 27 IN Y426H

## (undated) DEVICE — VIOLET BRAIDED (POLYGLACTIN 910), SYNTHETIC ABSORBABLE SUTURE: Brand: COATED VICRYL

## (undated) DEVICE — LIGACLIP MCA MULTIPLE CLIP APPLIERS, 20 MEDIUM CLIPS: Brand: LIGACLIP

## (undated) DEVICE — SPONGE STICK WITH PVP-I: Brand: KENDALL

## (undated) DEVICE — NEEDLE 25G X 1 1/2

## (undated) DEVICE — SUT PROLENE 6-0 P-3 18 IN 8695G

## (undated) DEVICE — CAUTERY TIP POLISHER: Brand: DEVON

## (undated) DEVICE — KERLIX BANDAGE ROLL: Brand: KERLIX

## (undated) DEVICE — CHLORAPREP HI-LITE 26ML ORANGE

## (undated) DEVICE — PENROSE DRAIN, 18 X 3 8: Brand: CARDINAL HEALTH

## (undated) DEVICE — SKIN MARKER DUAL TIP WITH RULER CAP, FLEXIBLE RULER AND LABELS: Brand: DEVON

## (undated) DEVICE — SUT PROLENE 3-0 SH 36 IN 8522H

## (undated) DEVICE — SUPER SPONGES,MEDIUM: Brand: KERLIX

## (undated) DEVICE — SUT ETHILON 3-0 FS-1 18 IN 663G

## (undated) DEVICE — SPECIMEN CONTAINER STERILE PEEL PACK

## (undated) DEVICE — VIAL DECANTER

## (undated) DEVICE — PLUMEPEN PRO 10FT

## (undated) DEVICE — GLOVE SRG BIOGEL 7.5

## (undated) DEVICE — INTENDED FOR TISSUE SEPARATION, AND OTHER PROCEDURES THAT REQUIRE A SHARP SURGICAL BLADE TO PUNCTURE OR CUT.: Brand: BARD-PARKER SAFETY BLADES SIZE 15, STERILE

## (undated) DEVICE — ADHESIVE SKIN HIGH VISCOSITY EXOFIN 1ML

## (undated) DEVICE — PENCIL ELECTROSURG E-Z CLEAN -0035H

## (undated) DEVICE — SUT VICRYL 2-0 SH 27 IN UNDYED J417H

## (undated) DEVICE — INTENDED FOR TISSUE SEPARATION, AND OTHER PROCEDURES THAT REQUIRE A SHARP SURGICAL BLADE TO PUNCTURE OR CUT.: Brand: BARD-PARKER ® CARBON RIB-BACK BLADES

## (undated) DEVICE — SUT SILK 0 30 IN A306H

## (undated) DEVICE — PACK PBDS EAR RF

## (undated) DEVICE — GLOVE INDICATOR PI UNDERGLOVE SZ 8 BLUE

## (undated) DEVICE — INTENDED FOR TISSUE SEPARATION, AND OTHER PROCEDURES THAT REQUIRE A SHARP SURGICAL BLADE TO PUNCTURE OR CUT.: Brand: BARD-PARKER SAFETY BLADES SIZE 10, STERILE

## (undated) DEVICE — SUT SILK 0 SH 30 IN K834H

## (undated) DEVICE — LIGHT HANDLE COVER SLEEVE DISP BLUE STELLAR

## (undated) DEVICE — PAD GROUNDING ADULT

## (undated) DEVICE — BETHLEHEM UNIVERSAL MINOR GEN: Brand: CARDINAL HEALTH

## (undated) DEVICE — GLOVE INDICATOR PI UNDERGLOVE SZ 7.5 BLUE

## (undated) DEVICE — SUT NYLON 5-0 P-3 18 IN 690G

## (undated) DEVICE — SUT CHROMIC 6-0 G-1 18 IN 796G

## (undated) DEVICE — GAUZE SPONGES,16 PLY: Brand: CURITY

## (undated) DEVICE — DRAPE EQUIPMENT RF WAND

## (undated) DEVICE — GLOVE SRG BIOGEL ECLIPSE 7.5

## (undated) DEVICE — POOLE SUCTION HANDLE: Brand: CARDINAL HEALTH

## (undated) DEVICE — DRESSING XEROFORM 5 X 9

## (undated) DEVICE — DRAPE SHEET THREE QUARTER

## (undated) DEVICE — PVC URETHRAL CATHETER: Brand: DOVER

## (undated) DEVICE — 10FR FRAZIER SUCTION HANDLE: Brand: CARDINAL HEALTH

## (undated) DEVICE — TUBING SUCTION 5MM X 12 FT

## (undated) DEVICE — ACE WRAP 4 IN UNSTERILE

## (undated) DEVICE — 2000CC GUARDIAN II: Brand: GUARDIAN

## (undated) DEVICE — GLOVE SRG BIOGEL ORTHOPEDIC 7.5

## (undated) DEVICE — ABDOMINAL PAD: Brand: DERMACEA

## (undated) DEVICE — ROSEBUD DISSECTORS: Brand: DEROYAL